# Patient Record
Sex: MALE | Race: WHITE | Employment: FULL TIME | URBAN - METROPOLITAN AREA
[De-identification: names, ages, dates, MRNs, and addresses within clinical notes are randomized per-mention and may not be internally consistent; named-entity substitution may affect disease eponyms.]

---

## 2017-03-15 ENCOUNTER — OFFICE VISIT (OUTPATIENT)
Dept: FAMILY MEDICINE CLINIC | Age: 42
End: 2017-03-15

## 2017-03-15 VITALS
HEART RATE: 64 BPM | DIASTOLIC BLOOD PRESSURE: 78 MMHG | SYSTOLIC BLOOD PRESSURE: 120 MMHG | HEIGHT: 73 IN | BODY MASS INDEX: 35.98 KG/M2 | RESPIRATION RATE: 16 BRPM | WEIGHT: 271.5 LBS

## 2017-03-15 DIAGNOSIS — J20.9 ACUTE BRONCHITIS, UNSPECIFIED ORGANISM: Primary | ICD-10-CM

## 2017-03-15 PROCEDURE — 99213 OFFICE O/P EST LOW 20 MIN: CPT | Performed by: FAMILY MEDICINE

## 2017-03-15 RX ORDER — ASCORBIC ACID 500 MG
500 TABLET ORAL DAILY
COMMUNITY

## 2017-03-15 RX ORDER — AZITHROMYCIN 250 MG/1
TABLET, FILM COATED ORAL
Qty: 1 PACKET | Refills: 0 | Status: SHIPPED | OUTPATIENT
Start: 2017-03-15 | End: 2017-03-25

## 2017-03-15 RX ORDER — GUAIFENESIN AND CODEINE PHOSPHATE 100; 10 MG/5ML; MG/5ML
10 SOLUTION ORAL 4 TIMES DAILY PRN
Qty: 180 ML | Refills: 0 | Status: SHIPPED | OUTPATIENT
Start: 2017-03-15 | End: 2017-10-04

## 2017-03-15 RX ORDER — PREDNISONE 20 MG/1
20 TABLET ORAL DAILY
Qty: 7 TABLET | Refills: 0 | Status: SHIPPED | OUTPATIENT
Start: 2017-03-15 | End: 2017-03-22

## 2017-03-15 ASSESSMENT — ENCOUNTER SYMPTOMS
DIARRHEA: 0
COUGH: 1
EYE DISCHARGE: 0
SINUS PRESSURE: 0
SORE THROAT: 0
NAUSEA: 0
CHEST TIGHTNESS: 1
RHINORRHEA: 0
SHORTNESS OF BREATH: 0

## 2017-09-25 ENCOUNTER — TELEPHONE (OUTPATIENT)
Dept: FAMILY MEDICINE CLINIC | Age: 42
End: 2017-09-25

## 2017-10-04 ENCOUNTER — OFFICE VISIT (OUTPATIENT)
Dept: FAMILY MEDICINE CLINIC | Age: 42
End: 2017-10-04

## 2017-10-04 VITALS
HEART RATE: 60 BPM | SYSTOLIC BLOOD PRESSURE: 124 MMHG | DIASTOLIC BLOOD PRESSURE: 66 MMHG | HEIGHT: 73 IN | RESPIRATION RATE: 14 BRPM | TEMPERATURE: 98.3 F | BODY MASS INDEX: 36.75 KG/M2 | WEIGHT: 277.25 LBS

## 2017-10-04 DIAGNOSIS — Z23 NEED FOR IMMUNIZATION AGAINST INFLUENZA: ICD-10-CM

## 2017-10-04 DIAGNOSIS — Z12.11 SCREEN FOR COLON CANCER: ICD-10-CM

## 2017-10-04 DIAGNOSIS — Z00.00 WELL ADULT EXAM: Primary | ICD-10-CM

## 2017-10-04 PROCEDURE — 90471 IMMUNIZATION ADMIN: CPT | Performed by: FAMILY MEDICINE

## 2017-10-04 PROCEDURE — 90688 IIV4 VACCINE SPLT 0.5 ML IM: CPT | Performed by: FAMILY MEDICINE

## 2017-10-04 PROCEDURE — 99396 PREV VISIT EST AGE 40-64: CPT | Performed by: FAMILY MEDICINE

## 2017-10-04 ASSESSMENT — PATIENT HEALTH QUESTIONNAIRE - PHQ9
SUM OF ALL RESPONSES TO PHQ9 QUESTIONS 1 & 2: 0
2. FEELING DOWN, DEPRESSED OR HOPELESS: 0
SUM OF ALL RESPONSES TO PHQ QUESTIONS 1-9: 0
1. LITTLE INTEREST OR PLEASURE IN DOING THINGS: 0

## 2017-10-04 ASSESSMENT — ENCOUNTER SYMPTOMS
CONSTIPATION: 0
SHORTNESS OF BREATH: 0
SINUS PRESSURE: 0

## 2017-10-04 NOTE — MR AVS SNAPSHOT
type 2 diabetes, stroke, gallstones, arthritis, sleep apnea, and certain cancers. BMI is not perfect. It may overestimate body fat in athletes and people who are more muscular. Even a small weight loss (between 5 and 10 percent of your current weight) by decreasing your calorie intake and becoming more physically active will help lower your risk of developing or worsening diseases associated with obesity. Learn more at: [employer]          Instructions    Do the fasting labs soon  See me in a year  Continue the diet and weight loss                  Today's Medication Changes          These changes are accurate as of: 10/4/17  2:57 PM.  If you have any questions, ask your nurse or doctor. STOP taking these medications           guaiFENesin-codeine 100-10 MG/5ML syrup   Commonly known as:  CHERATUSSIN AC   Stopped by:  Catia Day MD               Your Current Medications Are              Ascorbic Acid (VITAMIN C) 500 MG tablet Take 500 mg by mouth daily    Multiple Vitamins-Minerals (THERAPEUTIC MULTIVITAMIN-MINERALS) tablet Take 1 tablet by mouth daily.       Allergies           No Known Allergies      We Ordered/Performed the following           INFLUENZA, QUADV, 6 MO AND OLDER, IM, MDV, 0.5ML (Itzel Mcgraw)          Additional Information        Basic Information     Date Of Birth Sex Race Ethnicity Preferred Language    1975 Male White Non-/Non  English      Problem List as of 10/4/2017  Date Reviewed: 8/8/2012                Tobacco dependence in remission    Obesity, Class II, BMI 35-39.9, no comorbidity ([facility] [address]      Immunizations as of 10/4/2017     Name Date    Influenza Vaccine, unspecified formulation 10/2/2016    Influenza, Quadv, 6 mo and older, IM (Itzel Mcgraw) 10/4/2017      Preventive Care        Date Due    HIV screening is recommended for all people regardless of risk factors aged 15-65 years at least once (lifetime) who have never been HIV tested. 1/18/1991    Tetanus Combination Vaccine (1 - Tdap) 1/18/1995    Cholesterol Screening 1/18/2016            MyChart Signup           Clickst allows you to send messages to your doctor, view your test results, renew your prescriptions, schedule appointments, view visit notes, and more. How Do I Sign Up? 1. In your Internet browser, go to [email or web address]  2. Click on the Sign Up Now link in the Sign In box. You will see the New Member Sign Up page. 3. Enter your Clickst Access Code exactly as it appears below. You will not need to use this code after youve completed the sign-up process. If you do not sign up before the expiration date, you must request a new code. Clickst Access Code: [email or web address]  Expires: 12/3/2017  2:57 PM    4. Enter your Social Security Number ([id number]) and Date of Birth (mm/dd/yyyy) as indicated and click Submit. You will be taken to the next sign-up page. 5. Create a Clickst ID. This will be your Clickst login ID and cannot be changed, so think of one that is secure and easy to remember. 6. Create a Clickst password. You can change your password at any time. 7. Enter your Password Reset Question and Answer. This can be used at a later time if you forget your password. 8. Enter your e-mail address. You will receive e-mail notification when new information is available in [address] 9. Click Sign Up. You can now view your medical record. Additional Information  If you have questions, please contact the physician practice where you receive care. Remember, Clickst is NOT to be used for urgent needs. For medical emergencies, dial 911. For questions regarding your Clickst account call [phone]. If you have a clinical question, please call your doctor's office.

## 2017-10-09 ENCOUNTER — TELEPHONE (OUTPATIENT)
Dept: FAMILY MEDICINE CLINIC | Age: 42
End: 2017-10-09

## 2017-10-09 ENCOUNTER — OFFICE VISIT (OUTPATIENT)
Dept: FAMILY MEDICINE CLINIC | Age: 42
End: 2017-10-09

## 2017-10-09 VITALS
SYSTOLIC BLOOD PRESSURE: 110 MMHG | DIASTOLIC BLOOD PRESSURE: 70 MMHG | HEART RATE: 60 BPM | WEIGHT: 274.13 LBS | RESPIRATION RATE: 16 BRPM | HEIGHT: 73 IN | BODY MASS INDEX: 36.33 KG/M2

## 2017-10-09 DIAGNOSIS — J20.9 ACUTE BRONCHITIS, UNSPECIFIED ORGANISM: Primary | ICD-10-CM

## 2017-10-09 PROCEDURE — 99213 OFFICE O/P EST LOW 20 MIN: CPT | Performed by: FAMILY MEDICINE

## 2017-10-09 RX ORDER — GUAIFENESIN AND CODEINE PHOSPHATE 100; 10 MG/5ML; MG/5ML
10 SOLUTION ORAL 4 TIMES DAILY PRN
Qty: 180 ML | Refills: 0 | Status: SHIPPED | OUTPATIENT
Start: 2017-10-09 | End: 2017-10-16 | Stop reason: SDUPTHER

## 2017-10-09 ASSESSMENT — ENCOUNTER SYMPTOMS
NAUSEA: 0
DIARRHEA: 0
SINUS PRESSURE: 1
SORE THROAT: 1
CHEST TIGHTNESS: 0
WHEEZING: 0
COUGH: 1
EYE DISCHARGE: 0

## 2017-10-09 NOTE — PROGRESS NOTES
Subjective:      Patient ID: Rigo Squires is a 40 y.o. male. HPI  1. He states that the day after he was here there was onset of cough and congestion  2. He used nyquil, robitussin DM without help  3. He will cough to the point where he can't catch his breath  Review of Systems   Constitutional: Negative for chills and fatigue. HENT: Positive for congestion, sinus pressure and sore throat. Negative for ear pain. Eyes: Negative for discharge. Respiratory: Positive for cough. Negative for chest tightness and wheezing. Cardiovascular: Negative for chest pain. Gastrointestinal: Negative for diarrhea and nausea. Genitourinary: Negative. Musculoskeletal: Positive for arthralgias and myalgias. Neurological: Negative for headaches. The patient's medications, allergies, past medical problems, surgical, social, and family histories were reviewed and updated as needed. Objective:   Physical Exam   Constitutional: He is oriented to person, place, and time. He appears well-developed and well-nourished. No distress. HENT:   Head: Normocephalic and atraumatic. Right Ear: External ear normal.   Left Ear: External ear normal.   Nose: Nose normal.   Mouth/Throat: Oropharynx is clear and moist.   Eyes: Conjunctivae are normal. No scleral icterus. Neck: Neck supple. No tracheal deviation present. No thyromegaly present. Cardiovascular: Normal rate, regular rhythm and normal heart sounds. Pulmonary/Chest: Effort normal and breath sounds normal.   Musculoskeletal: He exhibits no edema. Lymphadenopathy:     He has no cervical adenopathy. Neurological: He is alert and oriented to person, place, and time. Skin: Skin is warm and dry. Psychiatric: He has a normal mood and affect. His behavior is normal.   Blood pressure 110/70, pulse 60, resp. rate 16, height 6' 1\" (1.854 m), weight 274 lb 2 oz (124.3 kg). Assessment:      1.  Acute bronchitis, unspecified organism  guaiFENesin-codeine (CHERATUSSIN AC) 100-10 MG/5ML syrup           Plan:      Off today

## 2017-10-09 NOTE — TELEPHONE ENCOUNTER
Patient called C/O dry cough, worse at night. No other complaints just the cough. Has tried Robitussin and Nyquil and neither really do anything for the cough. Uses Mover.

## 2017-10-09 NOTE — LETTER
[facility]  [address],[facility] [address] [address]  Phone: [phone]  Fax: [phone]    Mick Mendenhall MD        October 9, 2017     Patient: Natalee Mcclain   YOB: 1975   Date of Visit: 10/9/2017       To Whom it May Concern: Hanna Robledo was seen in my clinic on 10/9/2017. He may return to work on 10/10/17. Please excuse him for today due to illness. If you have any questions or concerns, please don't hesitate to call.     Sincerely,         Mick Mendenhall MD

## 2017-10-16 DIAGNOSIS — J20.9 ACUTE BRONCHITIS, UNSPECIFIED ORGANISM: ICD-10-CM

## 2017-10-16 RX ORDER — GUAIFENESIN AND CODEINE PHOSPHATE 100; 10 MG/5ML; MG/5ML
10 SOLUTION ORAL 4 TIMES DAILY PRN
Qty: 180 ML | Refills: 0 | Status: SHIPPED | OUTPATIENT
Start: 2017-10-16 | End: 2017-11-16 | Stop reason: SDUPTHER

## 2017-10-16 NOTE — TELEPHONE ENCOUNTER
Pt called and req refill on cough syrup Dr prescribed.     Please send to:    [facility]

## 2017-11-16 ENCOUNTER — TELEPHONE (OUTPATIENT)
Dept: FAMILY MEDICINE CLINIC | Age: 42
End: 2017-11-16

## 2017-11-16 DIAGNOSIS — J20.9 ACUTE BRONCHITIS, UNSPECIFIED ORGANISM: ICD-10-CM

## 2017-11-16 RX ORDER — GUAIFENESIN AND CODEINE PHOSPHATE 100; 10 MG/5ML; MG/5ML
10 SOLUTION ORAL 4 TIMES DAILY PRN
Qty: 180 ML | Refills: 0 | Status: SHIPPED | OUTPATIENT
Start: 2017-11-16 | End: 2019-01-15 | Stop reason: SDUPTHER

## 2017-11-16 NOTE — TELEPHONE ENCOUNTER
Pt called said that cough is back. Said that he got better for about a week and a half and then it just came right back. Wondering if he could get another Rx for the cough med. Zachary Verdin it is especially bad at night.     [facility] [place])

## 2019-01-15 ENCOUNTER — OFFICE VISIT (OUTPATIENT)
Dept: FAMILY MEDICINE CLINIC | Age: 44
End: 2019-01-15

## 2019-01-15 VITALS
RESPIRATION RATE: 16 BRPM | WEIGHT: 275.8 LBS | BODY MASS INDEX: 36.55 KG/M2 | SYSTOLIC BLOOD PRESSURE: 132 MMHG | DIASTOLIC BLOOD PRESSURE: 74 MMHG | HEIGHT: 73 IN | HEART RATE: 76 BPM | TEMPERATURE: 98 F

## 2019-01-15 DIAGNOSIS — J01.90 ACUTE SINUSITIS, RECURRENCE NOT SPECIFIED, UNSPECIFIED LOCATION: Primary | ICD-10-CM

## 2019-01-15 DIAGNOSIS — J20.9 ACUTE BRONCHITIS, UNSPECIFIED ORGANISM: ICD-10-CM

## 2019-01-15 PROCEDURE — 99213 OFFICE O/P EST LOW 20 MIN: CPT | Performed by: EMERGENCY MEDICINE

## 2019-01-15 RX ORDER — LEVOFLOXACIN 500 MG/1
500 TABLET, FILM COATED ORAL DAILY
Qty: 10 TABLET | Refills: 0 | Status: SHIPPED | OUTPATIENT
Start: 2019-01-15 | End: 2019-01-25

## 2019-01-15 RX ORDER — GUAIFENESIN AND CODEINE PHOSPHATE 100; 10 MG/5ML; MG/5ML
10 SOLUTION ORAL 4 TIMES DAILY PRN
Qty: 180 ML | Refills: 0 | Status: SHIPPED | OUTPATIENT
Start: 2019-01-15 | End: 2019-03-11 | Stop reason: SDUPTHER

## 2019-01-15 ASSESSMENT — ENCOUNTER SYMPTOMS
SHORTNESS OF BREATH: 0
SINUS PRESSURE: 0
COUGH: 1
WHEEZING: 0
BACK PAIN: 0
SORE THROAT: 0
NAUSEA: 0
CHEST TIGHTNESS: 0
DIARRHEA: 0
CONSTIPATION: 0
TROUBLE SWALLOWING: 0
VOMITING: 0
VOICE CHANGE: 1
ABDOMINAL PAIN: 0
RHINORRHEA: 1

## 2019-03-11 ENCOUNTER — TELEPHONE (OUTPATIENT)
Dept: FAMILY MEDICINE CLINIC | Age: 44
End: 2019-03-11

## 2019-03-11 DIAGNOSIS — J20.9 ACUTE BRONCHITIS, UNSPECIFIED ORGANISM: ICD-10-CM

## 2019-03-11 DIAGNOSIS — J01.90 ACUTE SINUSITIS, RECURRENCE NOT SPECIFIED, UNSPECIFIED LOCATION: ICD-10-CM

## 2019-03-24 RX ORDER — GUAIFENESIN AND CODEINE PHOSPHATE 100; 10 MG/5ML; MG/5ML
10 SOLUTION ORAL 4 TIMES DAILY PRN
Qty: 150 ML | Refills: 0 | OUTPATIENT
Start: 2019-03-24 | End: 2019-11-25 | Stop reason: SDUPTHER

## 2019-11-20 ENCOUNTER — TELEPHONE (OUTPATIENT)
Dept: FAMILY MEDICINE CLINIC | Age: 44
End: 2019-11-20

## 2019-11-20 RX ORDER — BENZONATATE 200 MG/1
200 CAPSULE ORAL 3 TIMES DAILY PRN
Qty: 30 CAPSULE | Refills: 0 | Status: SHIPPED | OUTPATIENT
Start: 2019-11-20 | End: 2019-11-27

## 2019-11-25 ENCOUNTER — OFFICE VISIT (OUTPATIENT)
Dept: FAMILY MEDICINE CLINIC | Age: 44
End: 2019-11-25

## 2019-11-25 VITALS
HEART RATE: 72 BPM | WEIGHT: 282 LBS | RESPIRATION RATE: 10 BRPM | BODY MASS INDEX: 37.37 KG/M2 | HEIGHT: 73 IN | DIASTOLIC BLOOD PRESSURE: 70 MMHG | SYSTOLIC BLOOD PRESSURE: 126 MMHG

## 2019-11-25 DIAGNOSIS — J20.9 ACUTE BRONCHITIS, UNSPECIFIED ORGANISM: ICD-10-CM

## 2019-11-25 DIAGNOSIS — J01.90 ACUTE SINUSITIS, RECURRENCE NOT SPECIFIED, UNSPECIFIED LOCATION: ICD-10-CM

## 2019-11-25 PROCEDURE — 99213 OFFICE O/P EST LOW 20 MIN: CPT | Performed by: FAMILY MEDICINE

## 2019-11-25 RX ORDER — AMOXICILLIN AND CLAVULANATE POTASSIUM 875; 125 MG/1; MG/1
1 TABLET, FILM COATED ORAL 2 TIMES DAILY
Qty: 20 TABLET | Refills: 0 | Status: SHIPPED | OUTPATIENT
Start: 2019-11-25 | End: 2019-12-05

## 2019-11-25 RX ORDER — GUAIFENESIN AND CODEINE PHOSPHATE 100; 10 MG/5ML; MG/5ML
10 SOLUTION ORAL 4 TIMES DAILY PRN
Qty: 150 ML | Refills: 0 | Status: SHIPPED | OUTPATIENT
Start: 2019-11-25 | End: 2021-04-27 | Stop reason: SDUPTHER

## 2019-11-25 ASSESSMENT — PATIENT HEALTH QUESTIONNAIRE - PHQ9
2. FEELING DOWN, DEPRESSED OR HOPELESS: 0
SUM OF ALL RESPONSES TO PHQ9 QUESTIONS 1 & 2: 0
1. LITTLE INTEREST OR PLEASURE IN DOING THINGS: 0
SUM OF ALL RESPONSES TO PHQ QUESTIONS 1-9: 0
SUM OF ALL RESPONSES TO PHQ QUESTIONS 1-9: 0

## 2019-11-25 ASSESSMENT — ENCOUNTER SYMPTOMS
BLOOD IN STOOL: 0
SHORTNESS OF BREATH: 0
SORE THROAT: 1
BACK PAIN: 0
ABDOMINAL PAIN: 0
NAUSEA: 0
RHINORRHEA: 1
WHEEZING: 0
COUGH: 1
CHEST TIGHTNESS: 0
CONSTIPATION: 0
EYE PAIN: 0
TROUBLE SWALLOWING: 0

## 2020-01-03 ENCOUNTER — OFFICE VISIT (OUTPATIENT)
Dept: FAMILY MEDICINE CLINIC | Age: 45
End: 2020-01-03

## 2020-01-03 VITALS
HEIGHT: 72 IN | SYSTOLIC BLOOD PRESSURE: 100 MMHG | RESPIRATION RATE: 16 BRPM | BODY MASS INDEX: 38.23 KG/M2 | WEIGHT: 282.25 LBS | HEART RATE: 70 BPM | DIASTOLIC BLOOD PRESSURE: 60 MMHG

## 2020-01-03 PROCEDURE — 99214 OFFICE O/P EST MOD 30 MIN: CPT | Performed by: FAMILY MEDICINE

## 2020-01-03 PROCEDURE — 93000 ELECTROCARDIOGRAM COMPLETE: CPT | Performed by: FAMILY MEDICINE

## 2020-01-03 ASSESSMENT — ENCOUNTER SYMPTOMS
SINUS PRESSURE: 0
CONSTIPATION: 0
SHORTNESS OF BREATH: 1

## 2020-01-03 ASSESSMENT — PATIENT HEALTH QUESTIONNAIRE - PHQ9
1. LITTLE INTEREST OR PLEASURE IN DOING THINGS: 0
SUM OF ALL RESPONSES TO PHQ QUESTIONS 1-9: 0
2. FEELING DOWN, DEPRESSED OR HOPELESS: 0
SUM OF ALL RESPONSES TO PHQ9 QUESTIONS 1 & 2: 0
SUM OF ALL RESPONSES TO PHQ QUESTIONS 1-9: 0

## 2020-01-03 NOTE — PROGRESS NOTES
Echo and holter monitor scheduled for 1/9/2020  145pm arrive at 130pm no prep at [facility]. Patient informed via phone.

## 2020-01-03 NOTE — PROGRESS NOTES
Subjective:      Patient ID: Dellis Eisenmenger is a 38 y.o. male. HPI  1. He feels as if the heart is beating fast and hard. 2. It seems to be the past few weeks and steady the past 2 days  3. There is some caffeine intake but not over normal  4. He quit smoking in 2010 but smoked for 20 years  Review of Systems   Constitutional: Negative for fatigue. HENT: Negative for sinus pressure. Eyes: Negative for visual disturbance. Respiratory: Positive for shortness of breath. Cardiovascular: Positive for palpitations. Negative for chest pain. Gastrointestinal: Negative for constipation. Genitourinary: Negative. Musculoskeletal: Negative for arthralgias. Skin: Negative for rash. Neurological: Negative for headaches. The patient's medications, allergies, past medical problems, surgical, social, and family histories were reviewed and updated as needed. Objective:   Physical Exam  Constitutional:       General: He is not in acute distress. Appearance: He is well-developed. HENT:      Head: Normocephalic and atraumatic. Eyes:      General: No scleral icterus. Conjunctiva/sclera: Conjunctivae normal.   Neck:      Trachea: No tracheal deviation. Cardiovascular:      Rate and Rhythm: Normal rate and regular rhythm. Heart sounds: Normal heart sounds. Pulmonary:      Effort: Pulmonary effort is normal.      Breath sounds: Normal breath sounds. Skin:     General: Skin is warm and dry. Neurological:      Mental Status: He is alert and oriented to person, place, and time. Psychiatric:         Behavior: Behavior normal.     Blood pressure 100/60, pulse 70, resp. rate 16, height 6' (1.829 m), weight 282 lb 4 oz (128 kg). The ekg is compared to 3/16/16 and read by me showing NSR and no ischemic change    Assessment:       Diagnosis Orders   1.  Tachycardia  EKG 12 Lead    Lipid, Fasting    Comprehensive Metabolic Panel, Fasting    TSH    Echocardiogram complete    Holter Monitor 48 Clifford Berg MD, Cardiology, [facility]           Plan:      Do the fasting labs soon  Get the holter monitor and echo done  See Dr Oziel Srivastava  See me soon for the wellness exam        Paula Graves MD

## 2020-01-06 LAB
ALBUMIN SERPL-MCNC: 4.6 G/DL (ref 3.2–5.3)
ALK PHOSPHATASE: 90 U/L (ref 39–130)
ALT SERPL-CCNC: 42 U/L (ref 0–40)
ANION GAP SERPL CALCULATED.3IONS-SCNC: 9 MMOL/L (ref 4–12)
AST SERPL-CCNC: 28 U/L (ref 0–41)
BILIRUB SERPL-MCNC: 1.1 MG/DL (ref 0.3–1.2)
BUN BLDV-MCNC: 14 MG/DL (ref 5–23)
CALCIUM SERPL-MCNC: 9.5 MG/DL (ref 8.5–10.5)
CHLORIDE BLD-SCNC: 102 MMOL/L (ref 98–109)
CHOLESTEROL/HDL RATIO: 4.6 (ref 1–5)
CHOLESTEROL: 174 MG/DL (ref 150–200)
CO2: 30 MMOL/L (ref 22–32)
CREAT SERPL-MCNC: 1.06 MG/DL (ref 0.6–1.3)
EGFR AFRICAN AMERICAN: >60 ML/MIN/1.73SQ.M
EGFR IF NONAFRICAN AMERICAN: >60 ML/MIN/1.73SQ.M
GLUCOSE: 92 MG/DL (ref 65–99)
HDLC SERPL-MCNC: 38 MG/DL
LDL CHOLESTEROL CALCULATED: 107 MG/DL
LDL/HDL RATIO: 2.8
MAGNESIUM: 2.1 MG/DL (ref 1.8–2.6)
POTASSIUM SERPL-SCNC: 4.3 MMOL/L (ref 3.5–5)
SODIUM BLD-SCNC: 141 MMOL/L (ref 134–146)
TOTAL PROTEIN: 7 G/DL (ref 6–8)
TRIGL SERPL-MCNC: 144 MG/DL (ref 27–150)
TSH SERPL DL<=0.05 MIU/L-ACNC: 0.94 UIU/ML (ref 0.49–4.67)
VLDLC SERPL CALC-MCNC: 29 MG/DL (ref 0–30)

## 2020-01-09 ENCOUNTER — HOSPITAL ENCOUNTER (OUTPATIENT)
Dept: NON INVASIVE DIAGNOSTICS | Age: 45
Discharge: HOME | End: 2020-01-09
Payer: COMMERCIAL

## 2020-01-09 LAB
LV EF: 60 %
LVEF MODALITY: NORMAL

## 2020-01-09 PROCEDURE — 93306 TTE W/DOPPLER COMPLETE: CPT

## 2020-01-09 PROCEDURE — 93225 XTRNL ECG REC<48 HRS REC: CPT

## 2020-01-09 PROCEDURE — 93226 XTRNL ECG REC<48 HR SCAN A/R: CPT

## 2020-01-09 NOTE — PROCEDURES
The skin was prepped and a holter monitor was applied. The patient was instructed on the documentation of symptoms and the purpose of the holter as well as the things to avoid while wearing the holter. The patient was instructed to remove and return the holter on 01/11/2020.   The serial number of the holter that was applied is [id number]

## 2020-01-10 ENCOUNTER — TELEPHONE (OUTPATIENT)
Dept: FAMILY MEDICINE CLINIC | Age: 45
End: 2020-01-10

## 2020-01-10 NOTE — TELEPHONE ENCOUNTER
----- Message from Ellyn Sullivan MD sent at 1/9/2020  9:01 PM EST -----  Let him know the echocardiogram was ok

## 2020-01-10 NOTE — TELEPHONE ENCOUNTER
----- Message from Joe Mclain MD sent at 1/9/2020  9:37 PM EST -----  Let him know the labs were fine.

## 2020-01-13 LAB
ACQUISITION DURATION: NORMAL S
AVERAGE HEART RATE: 76 BPM
HOOKUP DATE: NORMAL
HOOKUP TIME: NORMAL
MAX HEART RATE TIME/DATE: NORMAL
MAX HEART RATE: 133 BPM
MIN HEART RATE TIME/DATE: NORMAL
MIN HEART RATE: 47 BPM
NUMBER OF QRS COMPLEXES: NORMAL
NUMBER OF SUPRAVENTRICULAR BEATS IN RUNS: 0
NUMBER OF SUPRAVENTRICULAR COUPLETS: 0
NUMBER OF SUPRAVENTRICULAR ECTOPICS: 11
NUMBER OF SUPRAVENTRICULAR ISOLATED BEATS: 11
NUMBER OF SUPRAVENTRICULAR RUNS: 0
NUMBER OF VENTRICULAR BEATS IN RUNS: 0
NUMBER OF VENTRICULAR BIGEMINAL CYCLES: 0
NUMBER OF VENTRICULAR COUPLETS: 0
NUMBER OF VENTRICULAR ECTOPICS: 5
NUMBER OF VENTRICULAR ISOLATED BEATS: 5
NUMBER OF VENTRICULAR RUNS: 0

## 2020-01-14 ENCOUNTER — TELEPHONE (OUTPATIENT)
Dept: FAMILY MEDICINE CLINIC | Age: 45
End: 2020-01-14

## 2020-01-14 NOTE — TELEPHONE ENCOUNTER
----- Message from Melissa Awan MD sent at 1/13/2020  9:34 PM EST -----  Let him know that there were some fast heart beats seen on the holter but no irregular rhythms seen. Did he have any of the hard and pounding episodes while he wore the monitor. It would be good to see Dr Sly Bull still as arranged.

## 2020-02-11 ENCOUNTER — OFFICE VISIT (OUTPATIENT)
Dept: CARDIOLOGY CLINIC | Age: 45
End: 2020-02-11
Payer: COMMERCIAL

## 2020-02-11 VITALS
DIASTOLIC BLOOD PRESSURE: 68 MMHG | HEART RATE: 68 BPM | HEIGHT: 72 IN | SYSTOLIC BLOOD PRESSURE: 134 MMHG | BODY MASS INDEX: 37.73 KG/M2 | WEIGHT: 278.6 LBS

## 2020-02-11 PROCEDURE — 99204 OFFICE O/P NEW MOD 45 MIN: CPT | Performed by: NUCLEAR MEDICINE

## 2020-02-11 ASSESSMENT — ENCOUNTER SYMPTOMS
ABDOMINAL PAIN: 0
PHOTOPHOBIA: 0
NAUSEA: 0
ABDOMINAL DISTENTION: 0
SHORTNESS OF BREATH: 1
CHEST TIGHTNESS: 0
ANAL BLEEDING: 0
DIARRHEA: 0
RHINORRHEA: 0
COLOR CHANGE: 0
CONSTIPATION: 0
VOMITING: 0
RECTAL PAIN: 0
BLOOD IN STOOL: 0
BACK PAIN: 0

## 2020-02-11 NOTE — PROGRESS NOTES
Completed    Hepatitis A vaccine  Aged Out    Hepatitis B vaccine  Aged Out    Hib vaccine  Aged Out    Meningococcal (ACWY) vaccine  Aged Out    Pneumococcal 0-64 years Vaccine  Aged Out       Subjective:  Review of Systems   Constitutional: Positive for fatigue. HENT: Negative for hearing loss and rhinorrhea. Eyes: Negative for photophobia. Respiratory: Positive for shortness of breath. Negative for chest tightness. Cardiovascular: Positive for palpitations. Negative for chest pain and leg swelling. Gastrointestinal: Negative for abdominal distention, abdominal pain, anal bleeding, blood in stool, constipation, diarrhea, nausea, rectal pain and vomiting. Endocrine: Negative for polyphagia. Genitourinary: Negative for dysuria, hematuria and urgency. Musculoskeletal: Negative for arthralgias, back pain, gait problem, joint swelling, myalgias, neck pain and neck stiffness. Skin: Negative for color change, pallor, rash and wound. Neurological: Negative for dizziness, syncope and light-headedness. Hematological: Negative for adenopathy. Psychiatric/Behavioral: Negative for confusion, decreased concentration, dysphoric mood and hallucinations. The patient is not nervous/anxious and is not hyperactive. Objective:  Physical Exam  HENT:      Nose: Nose normal.      Mouth/Throat:      Pharynx: No oropharyngeal exudate. Cardiovascular:      Rate and Rhythm: Regular rhythm. Tachycardia present. Heart sounds: Murmur present. Pulmonary:      Effort: No respiratory distress. Breath sounds: No stridor. No wheezing, rhonchi or rales. Chest:      Chest wall: No tenderness. Abdominal:      General: There is no distension. Palpations: There is no mass. Tenderness: There is no abdominal tenderness. There is no right CVA tenderness, left CVA tenderness, guarding or rebound. Hernia: No hernia is present.    Musculoskeletal:         General: No swelling, tenderness, deformity or signs of injury. Right lower leg: No edema. Left lower leg: No edema. Skin:     Coloration: Skin is not jaundiced or pale. Findings: No bruising, erythema, lesion or rash. Neurological:      Cranial Nerves: No cranial nerve deficit. Sensory: No sensory deficit. Motor: No weakness. Coordination: Coordination normal.      Gait: Gait normal.      Deep Tendon Reflexes: Reflexes normal.   Psychiatric:         Mood and Affect: Mood normal.         Thought Content: Thought content normal.       /68   Pulse 68   Ht 6' (1.829 m)   Wt 278 lb 9.6 oz (126.4 kg)   BMI 37.78 kg/m²     Assessment:      Diagnosis Orders   1. Tachycardia     possible A fib vs other arrhythmia  Likely sleep apnea   Moderate risk for CAD  Normal ECG     Plan:  No follow-ups on file. Discussed  Might need to be checked for sleep apnea  Obesity: extensive discussion was made with the patient regarding diet and weight control including specific food items to avoid and cut down  Cut down on caffeine  Consider a stress test   2 week event monitor  Continue risk factor modification and medical management  Thank you for allowing me to participate in the care of your patient. Please don't hesitate to contact me regarding any further issues related to the patient care    Orders Placed:  No orders of the defined types were placed in this encounter. Medications Prescribed:  No orders of the defined types were placed in this encounter. Discussed use, benefit, and side effects of prescribed medications. All patient questions answered. Pt voicedunderstanding. Instructed to continue current medications, diet and exercise. Continue risk factor modification and medical management. Patient agreed with treatment plan. Follow up as directed.     Electronically signedby Sania Barrett MD on 2/11/2020 at 7:39 AM

## 2020-02-17 ENCOUNTER — HOSPITAL ENCOUNTER (OUTPATIENT)
Dept: NON INVASIVE DIAGNOSTICS | Age: 45
Discharge: HOME | End: 2020-02-17
Payer: COMMERCIAL

## 2020-02-17 PROCEDURE — 0296T HC EXT ECG RECORDING 2-21 DAY HOOKUP: CPT

## 2020-03-10 NOTE — PROCEDURES
[address] [place] [address]                                 EVENT MONITOR    PATIENT NAME: MICHAEL JERONIMO                        :        1975  MED REC NO:   [id number]                           ROOM:  ACCOUNT NO:   [de-identified]                           ADMIT DATE: 2020  PROVIDER:     Merari Lockett M.D. CLINICAL HISTORY AND INDICATION:  This is a patient with tachycardia. EVENT MONITOR DESCRIPTION:  Event monitor was attached to the patient  between 2020 to 2020. EVENT MONITOR FINDINGS:  Baseline rhythm showed sinus rhythm with  occasional PVCs. There were several PVCs and PACs through the test,  most of the patient's events correlated with isolated ectopy, otherwise  no sustained arrhythmia. CONCLUSION:  1. Sinus rhythm. 2.  Unfortunately, this patient looks like have been mainly feeling the  isolated ventricular ectopy as noted on the multiple events trigger by  the patient; otherwise, there was no obvious sustained arrhythmia on  this monitor. Further evaluation to follow accordingly.         Shreya Chase M.D.    D: 2020 16:17:23       T: 2020 17:26:18     [email or web address]  Job#: [id number]     Doc#: [id number]    CC:

## 2020-04-24 ENCOUNTER — TELEMEDICINE (OUTPATIENT)
Dept: CARDIOLOGY CLINIC | Age: 45
End: 2020-04-24
Payer: COMMERCIAL

## 2020-04-24 ENCOUNTER — TELEPHONE (OUTPATIENT)
Dept: CARDIOLOGY CLINIC | Age: 45
End: 2020-04-24

## 2020-04-24 PROCEDURE — 99213 OFFICE O/P EST LOW 20 MIN: CPT | Performed by: NUCLEAR MEDICINE

## 2020-04-24 NOTE — TELEPHONE ENCOUNTER
Post-VV Call:    Left detailed message on patient's VM to schedule 8 month OV with Dr. Jacqueline Mcneal. Appt made for patient and next OV is 12-3-20 at 10:15    AVS mailed.

## 2020-05-15 ENCOUNTER — PATIENT MESSAGE (OUTPATIENT)
Dept: FAMILY MEDICINE CLINIC | Age: 45
End: 2020-05-15

## 2020-05-15 NOTE — TELEPHONE ENCOUNTER
From: Camilo Ron Nice  To: Nanci Peters MD  Sent: 5/15/2020 9:23 AM EDT  Subject: Non-Urgent Emilie Pappas,   I am having some ED type issues and wanted to know if you are the one to see or if you can refer me to another specialist. My symptoms involve numbness in the area. Please let me know if I need to schedule an appointment.   Thank Evy Campos

## 2020-05-18 NOTE — TELEPHONE ENCOUNTER
Pt called back asking if labs could be ordered and faxed to     [facility]    Pt req called once ordered so he can go to lab

## 2020-05-18 NOTE — TELEPHONE ENCOUNTER
I ordered the labs and would like to see him in the office when the results are available to go over them.

## 2020-06-03 ENCOUNTER — TELEPHONE (OUTPATIENT)
Dept: FAMILY MEDICINE CLINIC | Age: 45
End: 2020-06-03

## 2020-06-03 NOTE — TELEPHONE ENCOUNTER
Patient called wanting to know the results of labs he had drawn last week. Stated he went to [facility]. Results are in but they have not crossed over into the Epic system which we were waiting for. They are going to be scanned in.

## 2020-06-03 NOTE — TELEPHONE ENCOUNTER
----- Message from Lexa Reeder MD sent at 6/3/2020  1:34 PM EDT -----  Let him know that the testosterone level was fine and that the prolactin was up some.  I would like to repeat the non fasting prolactin level next week in the morning between 7-8 AM.

## 2020-06-09 LAB — PROLACTIN: 17.7 NG/ML (ref 2.6–13.1)

## 2020-06-10 ENCOUNTER — TELEPHONE (OUTPATIENT)
Dept: FAMILY MEDICINE CLINIC | Age: 45
End: 2020-06-10

## 2020-06-10 NOTE — TELEPHONE ENCOUNTER
Called patient insurance and mri doesn't meet testing criteria. Peer to peer number for  To call is [phone]. Case closes on 6/12/2020 please advise.

## 2020-06-17 ENCOUNTER — HOSPITAL ENCOUNTER (OUTPATIENT)
Dept: MRI IMAGING | Age: 45
Discharge: HOME | End: 2020-06-17
Payer: COMMERCIAL

## 2020-06-17 PROCEDURE — 6360000004 HC RX CONTRAST MEDICATION: Performed by: FAMILY MEDICINE

## 2020-06-17 PROCEDURE — 70553 MRI BRAIN STEM W/O & W/DYE: CPT

## 2020-06-17 PROCEDURE — A9579 GAD-BASE MR CONTRAST NOS,1ML: HCPCS | Performed by: FAMILY MEDICINE

## 2020-06-17 RX ADMIN — GADOTERIDOL 20 ML: 279.3 INJECTION, SOLUTION INTRAVENOUS at 17:14

## 2020-06-19 ENCOUNTER — TELEPHONE (OUTPATIENT)
Dept: FAMILY MEDICINE CLINIC | Age: 45
End: 2020-06-19

## 2020-06-19 NOTE — TELEPHONE ENCOUNTER
----- Message from Prachi Ch MD sent at 6/18/2020  9:45 PM EDT -----  Let him know the MRI of the brain was negative and it would be good to see Dr Ajay Almanza for the elevation of the prolactin level if he agrees.

## 2020-06-24 NOTE — TELEPHONE ENCOUNTER
Faxed referral to dr. Henrry Forbes and they will call and schedule patient. James Naidu informed by Terri.

## 2020-07-09 ENCOUNTER — PATIENT MESSAGE (OUTPATIENT)
Dept: FAMILY MEDICINE CLINIC | Age: 45
End: 2020-07-09

## 2020-07-09 NOTE — TELEPHONE ENCOUNTER
From: Anita Rivera Nice  To: Javier Young MD  Sent: 7/9/2020 10:09 AM EDT  Subject: Visit Follow-Up Question    Hello. I have tried to contact Dr. Pang office a few times. Unfortunately, they have very limited office hours. And have not called me back after leaving them messages about scheduling an appointment. They are closed by 3pm and take lunch at 12-1 with no one on the phones. This makes it very difficult for me to contact them. They are also closed on Fridays. Would it be possible for your office to contact them on my behalf and get an appointment scheduled? I would prefer afternoon as late as possible. The only dates I am not available is July 20-21st.     Please let me know.   [employer]

## 2020-07-13 ENCOUNTER — TELEPHONE (OUTPATIENT)
Dept: FAMILY MEDICINE CLINIC | Age: 45
End: 2020-07-13

## 2020-07-13 NOTE — TELEPHONE ENCOUNTER
Appt  Dr. Jairo Danielle is 7/29/2020 at 1pm.  Must have packet of papers sent to you filled out in whole. And wear mask.

## 2020-07-13 NOTE — TELEPHONE ENCOUNTER
MOM to return call to office   Appointment for dr. Saurabh Ontiveros office scheduled for 7/29/2020 at 1pm.  Must have packet filled out completely  And Must wear a mask.

## 2020-07-31 ENCOUNTER — PATIENT MESSAGE (OUTPATIENT)
Dept: FAMILY MEDICINE CLINIC | Age: 45
End: 2020-07-31

## 2020-08-26 ENCOUNTER — TELEPHONE (OUTPATIENT)
Dept: FAMILY MEDICINE CLINIC | Age: 45
End: 2020-08-26

## 2020-08-26 NOTE — TELEPHONE ENCOUNTER
Mya Hou called from Dr Velia Zhao ofc and would like to know if Dr Tatianna Kemp would clear for surgery. Dr Aydee Aguilar  Laminectomy & Decompression L3-4  Sept. 16, 2020  [facility]      Please call:   Mya Hou [phone]

## 2020-08-27 ENCOUNTER — TELEPHONE (OUTPATIENT)
Dept: FAMILY MEDICINE CLINIC | Age: 45
End: 2020-08-27

## 2020-09-01 ENCOUNTER — TELEPHONE (OUTPATIENT)
Dept: FAMILY MEDICINE CLINIC | Age: 45
End: 2020-09-01

## 2020-09-02 ENCOUNTER — OFFICE VISIT (OUTPATIENT)
Dept: FAMILY MEDICINE CLINIC | Age: 45
End: 2020-09-02

## 2020-09-02 VITALS
HEART RATE: 70 BPM | WEIGHT: 273.38 LBS | DIASTOLIC BLOOD PRESSURE: 80 MMHG | RESPIRATION RATE: 16 BRPM | BODY MASS INDEX: 37.03 KG/M2 | HEIGHT: 72 IN | TEMPERATURE: 97.2 F | SYSTOLIC BLOOD PRESSURE: 122 MMHG

## 2020-09-02 PROCEDURE — 69210 REMOVE IMPACTED EAR WAX UNI: CPT | Performed by: FAMILY MEDICINE

## 2020-09-02 PROCEDURE — 99396 PREV VISIT EST AGE 40-64: CPT | Performed by: FAMILY MEDICINE

## 2020-09-02 ASSESSMENT — ENCOUNTER SYMPTOMS
SINUS PRESSURE: 0
CONSTIPATION: 0
SHORTNESS OF BREATH: 0

## 2020-09-02 ASSESSMENT — PATIENT HEALTH QUESTIONNAIRE - PHQ9
2. FEELING DOWN, DEPRESSED OR HOPELESS: 0
SUM OF ALL RESPONSES TO PHQ9 QUESTIONS 1 & 2: 0
SUM OF ALL RESPONSES TO PHQ QUESTIONS 1-9: 0
1. LITTLE INTEREST OR PLEASURE IN DOING THINGS: 0
SUM OF ALL RESPONSES TO PHQ QUESTIONS 1-9: 0

## 2020-09-02 NOTE — PROGRESS NOTES
Subjective:      Patient ID: Rain Dejesus is a 41 y.o. male. HPI  Well Adult Physical   Patient here for a comprehensive physical exam.The patient reports problems - he needs a pre op eval for back surgery. He states no personal or FH of anesthesia problems, DVT or PE  Do you take any herbs or supplements that were not prescribed by a doctor? yes Are you taking calcium supplements? no Are you taking aspirin daily? no   History:  Patient receives prostate care at our clinic  Date last prostate exam: unknown  Date last PSA: N/A    Review of Systems   Constitutional: Negative for fatigue. HENT: Negative for sinus pressure. Eyes: Negative for visual disturbance. Respiratory: Negative for shortness of breath. Cardiovascular: Negative for chest pain. Gastrointestinal: Negative for constipation. Genitourinary: Negative. Musculoskeletal: Negative for arthralgias. Skin: Negative for rash. Neurological: Negative for headaches. The patient's medications, allergies, past medical problems, surgical, social, and family histories were reviewed and updated as needed. Objective:   Physical Exam  Constitutional:       General: He is not in acute distress. Appearance: He is well-developed. HENT:      Head: Normocephalic and atraumatic. Right Ear: External ear normal.      Left Ear: External ear normal.      Ears:      Comments: Bilateral cerumen impaction. Wax was removed from the affected ear(s) with a combination of the nurse using the water bottle, and myself using the curette. Inspection of the canal, and the tympanic membrane by myself afterward found no sign of injury. Nose: Nose normal.      Mouth/Throat:      Pharynx: No oropharyngeal exudate. Eyes:      General: No scleral icterus. Conjunctiva/sclera: Conjunctivae normal.   Neck:      Musculoskeletal: Neck supple. Thyroid: No thyromegaly. Vascular: No carotid bruit. Trachea: No tracheal deviation. Cardiovascular:      Rate and Rhythm: Normal rate and regular rhythm. Heart sounds: Normal heart sounds. Pulmonary:      Effort: Pulmonary effort is normal.      Breath sounds: Normal breath sounds. Abdominal:      General: Bowel sounds are normal.      Palpations: Abdomen is soft. There is no mass. Lymphadenopathy:      Cervical: No cervical adenopathy. Skin:     General: Skin is warm and dry. Neurological:      Mental Status: He is alert and oriented to person, place, and time. Psychiatric:         Behavior: Behavior normal.     Blood pressure 122/80, pulse 70, temperature 97.2 °F (36.2 °C), temperature source Infrared, resp. rate 16, height 6' (1.829 m), weight 273 lb 6 oz (124 kg). Assessment:       Diagnosis Orders   1. Well adult exam     2. Bilateral impacted cerumen  67048 - [place] REMOVE IMPACTED EAR WAX   3.  Pre-op evaluation             Plan:   See me in a year    The pre op evaluation is acceptable and he is cleared for surgery        Anitha Cherry MD

## 2020-09-02 NOTE — PROGRESS NOTES
Printed out all pre-op labs and imaging. [facility] called and left message to send ekg that patient just got via fax.   Awaiting ekg to send all pre-op to surgeon and hospital.

## 2020-09-08 ENCOUNTER — TELEPHONE (OUTPATIENT)
Dept: FAMILY MEDICINE CLINIC | Age: 45
End: 2020-09-08

## 2020-09-08 NOTE — TELEPHONE ENCOUNTER
Rocio with Dr. Dagoberto Fontaine office called asking if pt was cleared for surgery when seen on 09/02/2020. They are needing documentation faxed to them stating that pt is cleared.     Remi Good may be reached at [phone]    Fax [phone]

## 2020-09-09 NOTE — TELEPHONE ENCOUNTER
Clearance records was faxed twice yesterday and it bounced back- is the fax # correct: [phone]? MOM to return call to office (Dr Tejada Leaver office)  Records are on [employer].

## 2020-11-06 ENCOUNTER — APPOINTMENT (OUTPATIENT)
Dept: PHYSICAL THERAPY | Age: 45
End: 2020-11-06
Payer: COMMERCIAL

## 2020-11-09 ENCOUNTER — HOSPITAL ENCOUNTER (OUTPATIENT)
Dept: PHYSICAL THERAPY | Age: 45
Setting detail: THERAPIES SERIES
Discharge: HOME | End: 2020-11-09
Payer: COMMERCIAL

## 2020-11-16 ENCOUNTER — HOSPITAL ENCOUNTER (OUTPATIENT)
Dept: PHYSICAL THERAPY | Age: 45
Setting detail: THERAPIES SERIES
Discharge: HOME | End: 2020-11-16
Payer: COMMERCIAL

## 2020-11-16 PROCEDURE — 97110 THERAPEUTIC EXERCISES: CPT

## 2020-11-16 PROCEDURE — 97161 PT EVAL LOW COMPLEX 20 MIN: CPT

## 2020-11-16 NOTE — FLOWSHEET NOTE
** PLEASE SIGN, DATE AND TIME CERTIFICATION BELOW AND RETURN TO [facility] OUTPATIENT REHABILITATION (FAX #: [phone]). ATTEST/CO-SIGN IF ACCESSING VIA INCiRBA. THANK YOU.**    I certify that I have examined the patient below and determined that Physical Medicine and Rehabilitation service is necessary and that I approve the established plan of care for up to 90 days or as specifically noted. Attestation, signature or co-signature of physician indicates approval of certification requirements.    ________________________ ____________ __________  Physician Signature   Date   Time  [facility]  PHYSICAL THERAPY  [x] EVALUATION  [] DAILY NOTE (LAND) [] DAILY NOTE (AQUATIC ) [] PROGRESS NOTE [] DISCHARGE NOTE    [x] [facility]   [] [place] [address]    [] [facility]   [] Kelly Amabil    Date: 2020  Patient Name:  Lucille Prabhakar  : 1975  MRN: [id number]  CSN: [id number]    Referring Practitioner Veronica Patel MD   Diagnosis Radiculopathy, lumbar region [M54.16]  Spinal stenosis, lumbar region with neurogenic claudication [M48.062]  Intervertebral disc disorders with radiculopathy, 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]    Treatment Diagnosis Post-op and chronic lumbar and Right leg pain, core and B hip weakness, tightness in hips and back   Date of Evaluation 20    Additional Pertinent History L4-5 discectomy 2007, L4-5 fusion 3/2008, laminectomy L3-4 2020      Functional Outcome Measure Used Oswestry   Functional Outcome Score 16% (20)       Insurance: Primary: Payor: [employer] Grover /  /  / ,   Secondary:    Authorization Information: Aquatics and modalities covered except ionto, no telehealth   Visit # 1, 1/10 for progress note   Visits Allowed: 60 combined PT/OT/ST per calendar year   Recertification Date: 56   Physician Follow-Up: 2020   Physician Orders:    History of Present Illness: S/p L3-4 laminectomy 20. SUBJECTIVE: Pt continues to have pain in right leg with intermittent stabbing pains in right side after standing or walking extended periods of time. Pain worsened when he started working from home and sitting a lot. Pain is better since surgery. Pt wearing LSO brace only when traveling. Pain alleviated with rest. Pt denies use of heat, ice and only taking Tylenol as needed. Social/Functional History and Current Status:  Medications and Allergies have been reviewed and are listed on Medical History Questionnaire. Max Her lives with spouse in a multiple floor home with stairs and no handrail to enter. Task Previous Current   ADLs  Independent Independent   IADL's Independent limiting lifting, otherwise independent   Ambulation Independent Independent   Transfers Independent Independent   Recreation Independent -coaching basketball, playing with 2 sons [address] [employer] Independent   Driving Active  Active    Work [employer].   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     OBJECTIVE:    Pain: Denies current pain, 6/10 at worst with prolonged activity; achiness in right thigh   Palpation    Observation Decreased lumbar lordosis, normal spinal curvature, hips and shoulders level in standing; overweight   Posture        Range of Motion Lumbar flexion standing reaches just past knees d/t hamstring tightness, extension, lateral flexion and rotation limited 25% but no pain  Hip flexion limited 25-50% with lateral hip/thigh pain B, IR WFL with end range tightness, [facility] [place]   Strength Hip 4/5 B  Knee R flexion 4/5, ext 5/5, L 5/5  Ankle df 5/5   Coordination    Sensation Intact    Bed Mobility Independent    Transfers Independent    Ambulation Decreased pace; guarded with decreased arm swing; equal step length   Stairs Reciprocating pattern with light B handrail, no pain   Balance    Special Tests Negative SLR B, positive FADIR right         TREATMENT   Precautions: No heavy lifting   Pain: None currently    X in shaded column indicates activity completed today   Modalities Parameters/  Location  Notes                     Manual Therapy Time/Technique  Notes                     Exercise/Intervention   Notes   Stretches: piriformis (diag KTC), LTR, hamstring stretch with strap 3x 15 sec x    Abdominal bracing and posterior pelvic tilts 10x 5 sec x    DLS: alt march, alt UEs, alt combo UE/LE 10x  x                                                              Specific Interventions Next Treatment: progress core strengthening, manual/modalities as needed    Activity/Treatment Tolerance:  [x]  Patient tolerated treatment well  []  Patient limited by fatigue  []  Patient limited by pain   []  Patient limited by medical complications  []  Other:     Assessment: 41year old presents with chronic and post-op lumbar and right thigh pain causing muscular tightness in hip and hamstrings affecting posture, strength, and activity tolerance. Pt will benefit from PT to address listed impairments. Body Structures/Functions/Activity Limitations: impaired activity tolerance, impaired ROM, impaired strength and pain  Prognosis: good    GOALS:  Patient Goal: Be painfree    Short Term Goals:  Time Frame: 6 weeks  Patient will report reduced pain to 0/10 to tolerate sitting, standing, and walking longer durations. Patient will demonstrate good core engagement and postural awareness during therapy session without cues to carry over to functional activities, lifting, and housework. Patient will have minimal to no lumbar and hip AROM restriction for ease bending over and completing ADLs. Patient will improve B hip strength to 5/5 for stabilization when walking, lifting, and standing. Long Term Goals:  Time Frame: 12 weeks  Patient will be independent and compliant with HEP daily to achieve above goals.    Pt will reduce Oswestry score from 16% to 8% to tolerate prolonged activities. Patient Education:   [x]  HEP/Education Completed: Plan of Care, Goals, attendance policy, exercises completed above with handout provided  [address]  []  No new Education completed  []  Reviewed Prior HEP      [x]  Patient verbalized and/or demonstrated understanding of education provided. []  Patient unable to verbalize and/or demonstrate understanding of education provided. Will continue education. []  Barriers to learning:     PLAN:  Treatment Recommendations: Strengthening, Range of Motion, Manual Therapy - Soft Tissue Mobilization, Home Exercise Program, Patient Education and Modalities    [x]  Plan of care initiated. Plan to see patient 2 times per week for 12 weeks to address the treatment planned outlined above.   []  Continue with current plan of care  []  Modify plan of care as follows:    []  Hold pending physician visit  []  Discharge    Time In 1545   Time Out 1622   Timed Code Minutes: 10 min   Total Treatment Time: 37 min       Electronically Signed by: Pauline Marie

## 2020-11-19 ENCOUNTER — HOSPITAL ENCOUNTER (OUTPATIENT)
Dept: PHYSICAL THERAPY | Age: 45
Setting detail: THERAPIES SERIES
Discharge: HOME | End: 2020-11-19
Payer: COMMERCIAL

## 2020-11-19 PROCEDURE — 97110 THERAPEUTIC EXERCISES: CPT

## 2020-11-19 NOTE — PROGRESS NOTES
[address] [facility]  PHYSICAL THERAPY  [x] DAILY NOTE (LAND) [] DAILY NOTE (AQUATIC ) [] PROGRESS NOTE [] DISCHARGE NOTE    [x] OUTPATIENT REHABILITATION CENTER [facility]   [] [place] [address]    [] [facility]   [] [facility]    Date: 2020  Patient Name:  Nevaeh Man  : 1975  MRN: [id number]  CSN: [id number]    Referring Practitioner Nemo Haynes MD   Diagnosis Radiculopathy, lumbar region [M54.16]  Spinal stenosis, lumbar region with neurogenic claudication [M48.062]  Intervertebral disc disorders with radiculopathy, 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]    Treatment Diagnosis Post-op and chronic lumbar and Right leg pain, core and B hip weakness, tightness in hips and back   Date of Evaluation 20    Additional Pertinent History L4-5 discectomy 2007, L4-5 fusion 3/2008, laminectomy L3-4 2020      Functional Outcome Measure Used Oswestry   Functional Outcome Score 16% (20)       Insurance: Primary: Payor: [facility] /  /  / ,   Secondary:    Authorization Information: Aquatics and modalities covered except ionto, no telehealth   Visit # 2, 2/10 for progress note   Visits Allowed: 60 combined PT/OT/ST per calendar year   Recertification Date: 52   Physician Follow-Up: 2020   Physician Orders:    History of Present Illness: S/p L3-4 laminectomy 20. SUBJECTIVE: Patient reporting a little bit of low back soreness on right side following evaluation but currently reporting pain level 2/10.      TREATMENT   Precautions: No heavy lifting   Pain: 2/10 low back    X in shaded column indicates activity completed today   Modalities Parameters/  Location  Notes                     Manual Therapy Time/Technique  Notes                     Exercise/Intervention   Notes   Stretches: piriformis (diag KTC), LTR, hamstring stretch with strap 3 15 sec x    90/90 Hamstring stretch with towel  10 3 sec x    Abdominal bracing and posterior pelvic tilts 10 5 sec x DLS: alt march, alt UEs, alt combo UE/LE 10  x             Hip adduction with ball 10 5 sec x             SLR  10 No hold x    Side lying hip abduction and clam shell 10 each No hold  x    Prone hip extension  10  x Reporting a little pain on right side    Abdominal bracing with LAQ  10  x           Seated with bracing scapular retraction and horizontal abd  10 each  x             Specific Interventions Next Treatment: progress core strengthening, manual/modalities as needed    Activity/Treatment Tolerance:  [x]  Patient tolerated treatment well  []  Patient limited by fatigue  []  Patient limited by pain   []  Patient limited by medical complications  []  Other:     Assessment: Progressed with more strengthening exercises as noted with patient tolerating well. Patient only reporting having a little bit of discomfort with prone hip extension. Did add a few exercises for posture with patient tolerating well. Patient reporting pain level 2/10 at end of session and having no other complains. GOALS:  Patient Goal: Be pain free    Short Term Goals:  Time Frame: 6 weeks  Patient will report reduced pain to 0/10 to tolerate sitting, standing, and walking longer durations. Patient will demonstrate good core engagement and postural awareness during therapy session without cues to carry over to functional activities, lifting, and housework. Patient will have minimal to no lumbar and hip AROM restriction for ease bending over and completing ADL's. Patient will improve B hip strength to 5/5 for stabilization when walking, lifting, and standing. Long Term Goals:  Time Frame: 12 weeks  Patient will be independent and compliant with HEP daily to achieve above goals. Pt will reduce Oswestry score from 16% to 8% to tolerate prolonged activities. Patient Education:   [x]  HEP/Education Completed: hand out for SLR, hip abd, clam shell, hip adduction with ball.  Scap retraction and horizontal abdu with bracing  [employer] Access Code:[email or web address]     []  No new Education completed  []  Reviewed Prior HEP      [x]  Patient verbalized and/or demonstrated understanding of education provided. []  Patient unable to verbalize and/or demonstrate understanding of education provided. Will continue education. []  Barriers to learning:     PLAN:  Treatment Recommendations: Strengthening, Range of Motion, Manual Therapy - Soft Tissue Mobilization, Home Exercise Program, Patient Education and Modalities      [x]  Continue with current plan of care. 2 times per week for 12 weeks.   []  Modify plan of care as follows:    []  Hold pending physician visit  []  Discharge    Time In 1602   Time Out 1633   Timed Code Minutes: 31 min   Total Treatment Time: 31 min       Electronically Signed by: Corrina Maldonado

## 2020-11-24 ENCOUNTER — HOSPITAL ENCOUNTER (OUTPATIENT)
Dept: PHYSICAL THERAPY | Age: 45
Setting detail: THERAPIES SERIES
Discharge: HOME | End: 2020-11-24
Payer: COMMERCIAL

## 2020-11-24 PROCEDURE — 97110 THERAPEUTIC EXERCISES: CPT

## 2020-11-24 NOTE — PROGRESS NOTES
lBanca Fisher is a 25 year old female here today for her first prenatal visit. Patient's last menstrual period was 2018..  She has been experiencing mild breast tenderness as well as mild queasiness. She has had several episodes of spotting or bleeding. She is a      .    Her past medical history is  significant for previous term  delivery for CPD.    Visit Vitals  BP 90/58   Pulse 68   Ht 5' 5\" (1.651 m)   Wt 75.7 kg   LMP 2018   BMI 27.76 kg/m²   , Body mass index is 27.76 kg/m².;  On physical examination this is a well-developed well-nourished young white female who appears her chronological age. She is alert oriented x3, affect is appropriate.   Skin is warm and dry without apparent lesions.  HEENT reveals no apparent lymphadenopathy or thyromegaly.  Lungs are clear to auscultation and percussion.  Cardiac exam reveals regular rate and rhythm without apparent murmur click or rub.  Breasts are without obvious or dominant mass. There is no skin retraction, nipple discharge, axillary abnormalities.  Abdomen is soft nontender without obvious or dominant hepatosplenomegaly or defect. A uterine fundal height cannot be palpated abdominally.  Pelvic examination reveals normal-appearing external genitalia. The periurethral tissues and vaginal mucosa are without abnormalities. The cervix is without visible lesions. There are no obvious cervical abnormalities seen. Bimanual pelvic examination reveals the uterus to be consistent with 8-9 weeks size. Pelvimetry is clinically adequate.    Overall assessment is that of early pregnancy, size consistent with dates.    Plan:  Blanca declines first trimester screening.  She has already had 2 ultrasounds which confirmed viability.  She will undergo all routine lab testing today and return in 4 weeks for ongoing prenatal care    
response to progressions. [employer] Access Code:     []  No new Education completed  []  Reviewed Prior HEP      [x]  Patient verbalized and/or demonstrated understanding of education provided. []  Patient unable to verbalize and/or demonstrate understanding of education provided. Will continue education. []  Barriers to learning:     PLAN:  Treatment Recommendations: Strengthening, Range of Motion, Manual Therapy - Soft Tissue Mobilization, Home Exercise Program, Patient Education and Modalities      [x]  Continue with current plan of care. 2 times per week for 12 weeks.   []  Modify plan of care as follows:    []  Hold pending physician visit  []  Discharge    Time In 1538   Time Out 1628   Timed Code Minutes: 50 min   Total Treatment Time: 50 min       Electronically Signed by: Nikole Dueñas

## 2020-12-01 ENCOUNTER — APPOINTMENT (OUTPATIENT)
Dept: PHYSICAL THERAPY | Age: 45
End: 2020-12-01
Payer: COMMERCIAL

## 2020-12-03 ENCOUNTER — APPOINTMENT (OUTPATIENT)
Dept: PHYSICAL THERAPY | Age: 45
End: 2020-12-03
Payer: COMMERCIAL

## 2020-12-08 ENCOUNTER — APPOINTMENT (OUTPATIENT)
Dept: PHYSICAL THERAPY | Age: 45
End: 2020-12-08
Payer: COMMERCIAL

## 2020-12-10 ENCOUNTER — APPOINTMENT (OUTPATIENT)
Dept: PHYSICAL THERAPY | Age: 45
End: 2020-12-10
Payer: COMMERCIAL

## 2020-12-14 ENCOUNTER — HOSPITAL ENCOUNTER (OUTPATIENT)
Dept: PHYSICAL THERAPY | Age: 45
Setting detail: THERAPIES SERIES
Discharge: HOME | End: 2020-12-14
Payer: COMMERCIAL

## 2020-12-14 PROCEDURE — 97110 THERAPEUTIC EXERCISES: CPT

## 2020-12-14 NOTE — DISCHARGE SUMMARY
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]    Treatment Diagnosis Post-op and chronic lumbar and Right leg pain, core and B hip weakness, tightness in hips and back   Date of Evaluation 20    Additional Pertinent History L4-5 discectomy 2007, L4-5 fusion 3/2008, laminectomy L3-4 2020      Functional Outcome Measure Used Oswestry   Functional Outcome Score 16% (20) 0% (20)      Insurance: Primary: Payor: [facility] /  /  / ,   Secondary:    Authorization Information: Aquatics and modalities covered except ionto, no telehealth   Visit # 4, 4/10 for progress note   Visits Allowed: 60 combined PT/OT/ST per calendar year   Recertification Date:    Physician Follow-Up: 2020   Physician Orders:    History of Present Illness: S/p L3-4 laminectomy 20. SUBJECTIVE: Patient reports he is feeling better since jacobo COVID. Pt hasn't had any back pain, just muscle soreness from exercises. Pt feels ready to finish up with therapy.      TREATMENT   Precautions: No heavy lifting   Pain: 0/10 low back    X in shaded column indicates activity completed today   Modalities Parameters/  Location  Notes                     Manual Therapy Time/Technique  Notes                     Exercise/Intervention   Notes   Stretches: piriformis (diag KTC), LTR 3 15 sec x Reviewed for HEP   90/90 Hamstring stretch with towel  3 15 sec     Abdominal bracing and posterior pelvic tilts 10 5 sec     DLS: alt march, alt UEs, alt combo UE/LE 15               Shoulder flexion and chop R/L with red weighted ball  15               Hip abduction bilateral and unilateral  15 5  Green band            Hip adduction with ball 15 5 sec              SLR  2 x 10 No hold     Side lying hip abduction and clam shell 2x10 each No hold      Prone hip extension (knee flexed and extended)  10      Abdominal bracing with LAQ and HS curl  15   Green band used for HS curl          Seated on silver disc: horizontal abd, diagonals L/R, rows and extension3. Marching. Alt marching and Shoulder flexion  10               Specific Interventions Next Treatment: progress core strengthening, manual/modalities as needed    Activity/Treatment Tolerance:  [x]  Patient tolerated treatment well  []  Patient limited by fatigue  []  Patient limited by pain   []  Patient limited by medical complications  []  Other:     Assessment: Pt demos good progress toward goals. Pt continues to have mild hip weakness and decreased flexibility. Pt notes zero functional limitations on Oswestry. Pt requesting discharge at this time. GOALS:  Patient Goal: Be pain free    Short Term Goals:  Time Frame: 6 weeks  Patient will report reduced pain to 0/10 to tolerate sitting, standing, and walking longer durations. GOAL MET:  Pt denies pain after walking around the mall [place] shopping the other day. Discontinue Goal    Patient will demonstrate good core engagement and postural awareness during therapy session without cues to carry over to functional activities, lifting, and housework. GOAL MET:  Pt verbalizes awareness of abdominal bracing and posture at home. Discontinue Goal    Patient will have minimal to no lumbar and hip AROM restriction for ease bending over and completing ADL's. GOAL NOT MET: Pt demos minimal lumbar/hip tightness and moderate hamstring tightness, which is baseline.   Discontinue Goal    Patient

## 2020-12-17 ENCOUNTER — APPOINTMENT (OUTPATIENT)
Dept: PHYSICAL THERAPY | Age: 45
End: 2020-12-17
Payer: COMMERCIAL

## 2020-12-21 ENCOUNTER — APPOINTMENT (OUTPATIENT)
Dept: PHYSICAL THERAPY | Age: 45
End: 2020-12-21
Payer: COMMERCIAL

## 2021-04-27 DIAGNOSIS — J01.90 ACUTE SINUSITIS, RECURRENCE NOT SPECIFIED, UNSPECIFIED LOCATION: ICD-10-CM

## 2021-04-27 DIAGNOSIS — J20.9 ACUTE BRONCHITIS, UNSPECIFIED ORGANISM: ICD-10-CM

## 2021-04-27 RX ORDER — GUAIFENESIN AND CODEINE PHOSPHATE 100; 10 MG/5ML; MG/5ML
10 SOLUTION ORAL 4 TIMES DAILY PRN
Qty: 150 ML | Refills: 0 | Status: SHIPPED | OUTPATIENT
Start: 2021-04-27 | End: 2021-05-04 | Stop reason: SDUPTHER

## 2021-05-04 ENCOUNTER — VIRTUAL VISIT (OUTPATIENT)
Dept: FAMILY MEDICINE CLINIC | Age: 46
End: 2021-05-04

## 2021-05-04 DIAGNOSIS — J01.90 ACUTE SINUSITIS, RECURRENCE NOT SPECIFIED, UNSPECIFIED LOCATION: ICD-10-CM

## 2021-05-04 DIAGNOSIS — J01.20 ACUTE NON-RECURRENT ETHMOIDAL SINUSITIS: Primary | ICD-10-CM

## 2021-05-04 DIAGNOSIS — J20.9 ACUTE BRONCHITIS, UNSPECIFIED ORGANISM: ICD-10-CM

## 2021-05-04 PROCEDURE — 99213 OFFICE O/P EST LOW 20 MIN: CPT | Performed by: FAMILY MEDICINE

## 2021-05-04 RX ORDER — FEXOFENADINE HCL AND PSEUDOEPHEDRINE HCI 60; 120 MG/1; MG/1
1 TABLET, EXTENDED RELEASE ORAL 2 TIMES DAILY
Qty: 20 TABLET | Refills: 0 | Status: SHIPPED | OUTPATIENT
Start: 2021-05-04 | End: 2022-03-04 | Stop reason: CLARIF

## 2021-05-04 RX ORDER — AMOXICILLIN AND CLAVULANATE POTASSIUM 875; 125 MG/1; MG/1
1 TABLET, FILM COATED ORAL 2 TIMES DAILY
Qty: 20 TABLET | Refills: 0 | Status: SHIPPED | OUTPATIENT
Start: 2021-05-04 | End: 2021-05-14

## 2021-05-04 RX ORDER — GUAIFENESIN AND CODEINE PHOSPHATE 100; 10 MG/5ML; MG/5ML
10 SOLUTION ORAL 4 TIMES DAILY PRN
Qty: 150 ML | Refills: 0 | Status: SHIPPED | OUTPATIENT
Start: 2021-05-04 | End: 2022-01-10 | Stop reason: SDUPTHER

## 2021-05-04 SDOH — ECONOMIC STABILITY: TRANSPORTATION INSECURITY
IN THE PAST 12 MONTHS, HAS THE LACK OF TRANSPORTATION KEPT YOU FROM MEDICAL APPOINTMENTS OR FROM GETTING MEDICATIONS?: NO

## 2021-05-04 SDOH — ECONOMIC STABILITY: INCOME INSECURITY: HOW HARD IS IT FOR YOU TO PAY FOR THE VERY BASICS LIKE FOOD, HOUSING, MEDICAL CARE, AND HEATING?: NOT HARD AT ALL

## 2021-05-04 ASSESSMENT — ENCOUNTER SYMPTOMS
BACK PAIN: 0
EYE PAIN: 0
CONSTIPATION: 0
ABDOMINAL PAIN: 0
SORE THROAT: 0
NAUSEA: 0
COUGH: 1
CHEST TIGHTNESS: 0
SHORTNESS OF BREATH: 0
TROUBLE SWALLOWING: 0
BLOOD IN STOOL: 0

## 2021-05-04 ASSESSMENT — PATIENT HEALTH QUESTIONNAIRE - PHQ9
2. FEELING DOWN, DEPRESSED OR HOPELESS: 0
1. LITTLE INTEREST OR PLEASURE IN DOING THINGS: 0
SUM OF ALL RESPONSES TO PHQ QUESTIONS 1-9: 0

## 2021-05-04 NOTE — PROGRESS NOTES
Addis Mcfarlane agreed to Video Chat/Exam in presence of Dr Abdirahman Christopher and myself. Verified who was present in room with Addis Mcfarlane. Addis Mcfarlane informed the e-mail address used to [facility] cannot be used to contact the Provider, if they are any questions or concerns they need to call the office directly. Addis Mcfarlane stated understanding.

## 2022-01-10 DIAGNOSIS — J20.9 ACUTE BRONCHITIS, UNSPECIFIED ORGANISM: ICD-10-CM

## 2022-01-10 DIAGNOSIS — J01.90 ACUTE SINUSITIS, RECURRENCE NOT SPECIFIED, UNSPECIFIED LOCATION: ICD-10-CM

## 2022-01-10 RX ORDER — GUAIFENESIN AND CODEINE PHOSPHATE 100; 10 MG/5ML; MG/5ML
10 SOLUTION ORAL 4 TIMES DAILY PRN
Qty: 150 ML | Refills: 0 | Status: SHIPPED | OUTPATIENT
Start: 2022-01-10 | End: 2022-01-17

## 2022-03-04 ENCOUNTER — OFFICE VISIT (OUTPATIENT)
Dept: FAMILY MEDICINE CLINIC | Age: 47
End: 2022-03-04

## 2022-03-04 VITALS
DIASTOLIC BLOOD PRESSURE: 80 MMHG | HEIGHT: 72 IN | BODY MASS INDEX: 38.62 KG/M2 | HEART RATE: 68 BPM | WEIGHT: 285.13 LBS | RESPIRATION RATE: 16 BRPM | SYSTOLIC BLOOD PRESSURE: 136 MMHG | TEMPERATURE: 98.3 F

## 2022-03-04 DIAGNOSIS — Z00.00 WELL ADULT EXAM: Primary | ICD-10-CM

## 2022-03-04 DIAGNOSIS — Z12.11 SCREEN FOR COLON CANCER: ICD-10-CM

## 2022-03-04 DIAGNOSIS — R35.0 URINARY FREQUENCY: ICD-10-CM

## 2022-03-04 DIAGNOSIS — H61.23 BILATERAL IMPACTED CERUMEN: ICD-10-CM

## 2022-03-04 LAB
BACTERIA URINE, POC: NORMAL
BILIRUBIN URINE: 17 MG/DL
BLOOD, URINE: NEGATIVE
CASTS URINE, POC: NORMAL
CLARITY: CLEAR
COLOR: YELLOW
CRYSTALS URINE, POC: NORMAL
EPI CELLS URINE, POC: NORMAL
GLUCOSE URINE: NORMAL
KETONES, URINE: NEGATIVE
LEUKOCYTE EST, POC: NORMAL
NITRITE, URINE: NEGATIVE
PH UA: 7.5 (ref 4.5–8)
PROTEIN UA: NEGATIVE
RBC URINE, POC: NORMAL
SPECIFIC GRAVITY UA: 1.01 (ref 1–1.03)
UROBILINOGEN, URINE: NORMAL
WBC URINE, POC: NORMAL
YEAST URINE, POC: NORMAL

## 2022-03-04 PROCEDURE — 69210 REMOVE IMPACTED EAR WAX UNI: CPT | Performed by: FAMILY MEDICINE

## 2022-03-04 PROCEDURE — 99396 PREV VISIT EST AGE 40-64: CPT | Performed by: FAMILY MEDICINE

## 2022-03-04 PROCEDURE — 81000 URINALYSIS NONAUTO W/SCOPE: CPT | Performed by: FAMILY MEDICINE

## 2022-03-04 ASSESSMENT — ENCOUNTER SYMPTOMS
SINUS PRESSURE: 0
SHORTNESS OF BREATH: 0
CONSTIPATION: 0

## 2022-03-04 NOTE — PROGRESS NOTES
Mary Jane Guerrero (:  1975) is a 56 y.o. male,Established patient, here for evaluation of the following chief complaint(s):  Urinary Frequency         ASSESSMENT/PLAN:      Diagnosis Orders   1. Well adult exam     2. Urinary frequency  POC URINE with Microscopic   3. Screen for colon cancer  [facility] - Chris Luke MD, Gastroenterology, [facility]   4. Bilateral impacted cerumen  24924 - ID REMOVE IMPACTED EAR WAX       See me in a year  Stop the spicy foods and the caffeine use for at least 10 days and if the bladder troubles continue we can consult the [facility] group    Subjective   SUBJECTIVE/OBJECTIVE:  HPI  1. There has been some frequency about a month ago and there is an odor the past week. There is some urgency but no blood. 2. No past episodes  3. No new foods or vits. Some decrease in sexual activity. 4. No change in the caffeine use and no decongestants. He has increased the spicey foods since the Superbowl. Review of Systems   Constitutional: Negative for fatigue. HENT: Negative for sinus pressure. Eyes: Negative for visual disturbance. Respiratory: Negative for shortness of breath. Cardiovascular: Negative for chest pain. Gastrointestinal: Negative for constipation. Genitourinary: Positive for enuresis (just a few drops), frequency and urgency. Negative for difficulty urinating and dysuria. Increased nocturia   Musculoskeletal: Negative for arthralgias. Skin: Negative for rash. Neurological: Negative for headaches. The patient's medications, allergies, past medical problems, surgical, social, and family histories were reviewed and updated as needed. Objective   Physical Exam  Constitutional:       General: He is not in acute distress. Appearance: He is well-developed. HENT:      Head: Normocephalic and atraumatic. Right Ear: External ear normal. There is impacted cerumen. Left Ear: External ear normal. There is impacted cerumen.       Ears: Comments: Wax was removed from the affected ear(s) by myself, using the curette. Examination of the canal(s) by myself showed no sign of injury afterward. Nose: Nose normal.      Mouth/Throat:      Pharynx: No oropharyngeal exudate. Eyes:      General: No scleral icterus. Conjunctiva/sclera: Conjunctivae normal.   Neck:      Thyroid: No thyromegaly. Vascular: No carotid bruit. Trachea: No tracheal deviation. Cardiovascular:      Rate and Rhythm: Normal rate and regular rhythm. Heart sounds: Normal heart sounds. Pulmonary:      Effort: Pulmonary effort is normal.      Breath sounds: Normal breath sounds. Abdominal:      General: Bowel sounds are normal.      Palpations: Abdomen is soft. There is no mass. Hernia: There is no hernia in the left inguinal area or right inguinal area. Genitourinary:     Penis: Normal and uncircumcised. Testes: Normal.      Prostate: Normal.      Rectum: Normal.   Musculoskeletal:      Cervical back: Neck supple. Lymphadenopathy:      Cervical: No cervical adenopathy. Skin:     General: Skin is warm and dry. Neurological:      Mental Status: He is alert and oriented to person, place, and time. Psychiatric:         Behavior: Behavior normal.     Blood pressure 136/80, pulse 68, temperature 98.3 °F (36.8 °C), temperature source Skin, resp. rate 16, height 6' (1.829 m), weight 285 lb 2 oz (129.3 kg).     Results for orders placed or performed in visit on 03/04/22   POC URINE with Microscopic   Result Value Ref Range    Color, UA Yellow     Clarity, UA Clear Clear    Glucose, Ur neg     Bilirubin Urine 17 mg/dL    Ketones, Urine Negative     Specific Gravity, UA 1.015 1.005 - 1.030    Blood, Urine Negative     pH, UA 7.5 4.5 - 8.0    Protein, UA Negative Negative    Nitrite, Urine Negative     Leukocytes, UA neg     Urobilinogen, Urine 0.2 mg/dL     rbc urine, poc nond     wbc urine, poc none     bacteria urine, poc none     yeast urine, poc casts urine, poc      Epi Cells Urine, POC none     crystals urine, poc                      An electronic signature was used to authenticate this note.     --Judith Andrade MD

## 2022-03-04 NOTE — PATIENT INSTRUCTIONS
See me in a year  Stop the spicy foods and the caffeine use for at least 10 days and if the bladder troubles continue we can consult the [facility] group

## 2022-04-05 DIAGNOSIS — R35.0 URINARY FREQUENCY: Primary | ICD-10-CM

## 2022-05-06 ENCOUNTER — OFFICE VISIT (OUTPATIENT)
Dept: UROLOGY | Age: 47
End: 2022-05-06
Payer: COMMERCIAL

## 2022-05-06 VITALS
DIASTOLIC BLOOD PRESSURE: 74 MMHG | BODY MASS INDEX: 38.47 KG/M2 | WEIGHT: 284 LBS | SYSTOLIC BLOOD PRESSURE: 124 MMHG | HEIGHT: 72 IN

## 2022-05-06 DIAGNOSIS — N48.1 BALANITIS: ICD-10-CM

## 2022-05-06 DIAGNOSIS — R35.0 URINARY FREQUENCY: Primary | ICD-10-CM

## 2022-05-06 LAB
BILIRUBIN URINE: NEGATIVE
BLOOD URINE, POC: NEGATIVE
CHARACTER, URINE: CLEAR
COLOR, URINE: YELLOW
GLUCOSE URINE: NEGATIVE MG/DL
KETONES, URINE: NEGATIVE
LEUKOCYTE CLUMPS, URINE: NEGATIVE
NITRITE, URINE: NEGATIVE
PH, URINE: 5.5 (ref 5–9)
POST VOID RESIDUAL (PVR): 0 ML
PROTEIN, URINE: NEGATIVE MG/DL
SPECIFIC GRAVITY, URINE: <= 1.005 (ref 1–1.03)
UROBILINOGEN, URINE: 0.2 EU/DL (ref 0–1)

## 2022-05-06 PROCEDURE — 81003 URINALYSIS AUTO W/O SCOPE: CPT | Performed by: UROLOGY

## 2022-05-06 PROCEDURE — 99204 OFFICE O/P NEW MOD 45 MIN: CPT | Performed by: UROLOGY

## 2022-05-06 PROCEDURE — 51798 US URINE CAPACITY MEASURE: CPT | Performed by: UROLOGY

## 2022-05-06 RX ORDER — DOXYCYCLINE 100 MG/1
100 CAPSULE ORAL 2 TIMES DAILY
Qty: 14 CAPSULE | Refills: 0 | Status: SHIPPED | OUTPATIENT
Start: 2022-05-06 | End: 2022-05-13

## 2022-05-06 NOTE — PROGRESS NOTES
Dorys Mitchell MD.    [address]  [address] [place] [address]  Dept: [phone]  Dept Fax: [phone]  Loc: [address] [facility] Urology Office Note -     Patient:  Robi Schneider  YOB: 1975    The patient is a 56 y.o. male who presents today for evaluation of the following problems:   Chief Complaint   Patient presents with    New Patient     referred by Yasemin Garrett MD    Urinary Frequency    referred/consultation requested by Yasemin Garrett MD.    History of Present Illness:    LUTS  Some freq/urgency of void  Some incontinence at night  Foul odor   No dysuria  No hematuria  Not sexually active    Phimosis  Balanitis  There is discharge    Requested/reviewed records from Yasemin Garrett MD office and/or outside [de-identified]    (Patient's old records have been requested, reviewed and pertinent findings summarized in today's note.)    Procedures Today: N/A      Last several PSA's:  No results found for: PSA    Last total testosterone:  No results found for: TESTOSTERONE    Urinalysis today:  Results for POC orders placed in visit on 05/06/22   POCT Urinalysis No Micro (Auto)   Result Value Ref Range    Glucose, Ur Negative NEGATIVE mg/dl    Bilirubin Urine Negative     Ketones, Urine Negative NEGATIVE    Specific Gravity, Urine <= 1.005 1.002 - 1.030    Blood, UA POC Negative NEGATIVE    pH, Urine 5.50 5.0 - 9.0    Protein, Urine Negative NEGATIVE mg/dl    Urobilinogen, Urine 0.20 0.0 - 1.0 eu/dl    Nitrite, Urine Negative NEGATIVE    Leukocyte Clumps, Urine Negative NEGATIVE    Color, Urine Yellow YELLOW-STRAW    Character, Urine Clear CLR-SL.CLOUD   poct post void residual   Result Value Ref Range    post void residual 0 ml       Last BUN and creatinine:  Lab Results   Component Value Date    BUN 16 08/29/2020     Lab Results   Component Value Date    CREATININE 1.01 08/29/2020         Imaging Reviewed during this Office Visit:   Sakshi Brennan MD independently reviewed the images and verified the radiology reports from:    No results found. PAST MEDICAL, FAMILY AND SOCIAL HISTORY:  Past Medical History:   Diagnosis Date    COVID-19 virus infection 12/2020    Obesity, Class II, BMI 35-39.9, no comorbidity     Tobacco dependence in remission      Past Surgical History:   Procedure Laterality Date    ARM SURGERY  03/2016    [facility] doctor - possibly Dr Ingrid Mathews  2007, 2008    L 4-5    VASECTOMY  2013     Family History   Problem Relation Age of Onset    Heart Disease Father      Outpatient Medications Marked as Taking for the 5/6/22 encounter (Office Visit) with Jamilah Norris MD   Medication Sig Dispense Refill    Ascorbic Acid (VITAMIN C) 500 MG tablet Take 500 mg by mouth daily      Multiple Vitamins-Minerals (THERAPEUTIC MULTIVITAMIN-MINERALS) tablet Take 1 tablet by mouth daily. Patient has no known allergies. Social History     Tobacco Use   Smoking Status Former Smoker    Types: Cigarettes    Quit date: 5/24/2011    Years since quitting: 10.9   Smokeless Tobacco Never Used      (If patient a smoker, smoking cessation counseling offered)   Social History     Substance and Sexual Activity   Alcohol Use No       REVIEW OF SYSTEMS:  Constitutional: negative  Eyes: negative  Respiratory: negative  Cardiovascular: negative  Gastrointestinal: negative  Genitourinary: see HPI  Musculoskeletal: negative  Skin: negative   Neurological: negative  Hematological/Lymphatic: negative  Psychological: negative      Physical Exam:    This a 56 y.o. male  Vitals:    05/06/22 1236   BP: 124/74     Body mass index is 38.52 kg/m². Constitutional: Patient in no acute distress; Neuro: alert and oriented to person place and time. Psych: Mood and affect normal.  Skin: warm, dry  Lungs: Respiratory effort normal, Symmetric chest rise  Cardiovascular:  Normal peripheral pulses;  Regular rate, radial pulses palpable  Abdomen: Soft, non-tender, non-distended with no CVA, flank pain, hepatosplenomegaly or hernia. Kidneys normal.  Bladder non-tender and not distended. Lymphatics: no palpable lymphadenopathy  Minimal/no edema in bilateral lower extremities  Balanitis+     Assessment and Plan        1. Urinary frequency    2. Balanitis               Plan: Will treat for urethritis and balanitis  Doxy/mycolog  Follow up in 2-3 months. If no improvement, may need circumcision. Prescriptions Ordered:  No orders of the defined types were placed in this encounter.      Orders Placed:  Orders Placed This Encounter   Procedures    POCT Urinalysis No Micro (Auto)    poct post void residual     Bladder scan            DENISE Flores MD

## 2022-07-12 PROBLEM — D12.6 ADENOMATOUS COLON POLYP: Status: ACTIVE | Noted: 2022-05-27

## 2022-07-27 ENCOUNTER — OFFICE VISIT (OUTPATIENT)
Dept: UROLOGY | Age: 47
End: 2022-07-27
Payer: COMMERCIAL

## 2022-07-27 VITALS
HEIGHT: 72 IN | WEIGHT: 279 LBS | SYSTOLIC BLOOD PRESSURE: 142 MMHG | BODY MASS INDEX: 37.79 KG/M2 | DIASTOLIC BLOOD PRESSURE: 80 MMHG

## 2022-07-27 DIAGNOSIS — R35.0 URINARY FREQUENCY: Primary | ICD-10-CM

## 2022-07-27 DIAGNOSIS — N48.1 BALANITIS: ICD-10-CM

## 2022-07-27 PROCEDURE — 99214 OFFICE O/P EST MOD 30 MIN: CPT | Performed by: UROLOGY

## 2022-07-27 NOTE — PROGRESS NOTES
Kita Valenzuela MD.    [facility] [address]  [facility] [address]  Dept: [phone]  Dept Fax: [phone]  Loc: [address] [facility] Urology Office Note -     Patient:  Jay Pickering  YOB: 1975    The patient is a 56 y.o. male who presents today for evaluation of the following problems:   Chief Complaint   Patient presents with    Follow-up     Balanitis     Urinary Frequency    referred/consultation requested by Lawrence Gonzales MD.    History of Present Illness:    LUTS  Urethritis treated with doxy  Some freq/urgency of void-- worse at night  No foul smell  No dysuria  No hematuria  Not sexually active    Phimosis  Balanitis improved with mycolog cream    Requested/reviewed records from Lawrence Gonzales MD office and/or outside [de-identified]    (Patient's old records have been requested, reviewed and pertinent findings summarized in today's note.)    Procedures Today: N/A      Last several PSA's:  No results found for: PSA    Last total testosterone:  No results found for: TESTOSTERONE    Urinalysis today:  No results found for this visit on 07/27/22. Last BUN and creatinine:  Lab Results   Component Value Date    BUN 16 08/29/2020     Lab Results   Component Value Date    CREATININE 1.01 08/29/2020         Imaging Reviewed during this Office Visit:   Collette Moose, MD independently reviewed the images and verified the radiology reports from:    No results found.     PAST MEDICAL, FAMILY AND SOCIAL HISTORY:  Past Medical History:   Diagnosis Date    Adenomatous colon polyp 05/27/2022    COVID-19 virus infection 12/2020    Obesity, Class II, BMI 35-39.9, no comorbidity     Tobacco dependence in remission      Past Surgical History:   Procedure Laterality Date    ARM SURGERY  03/2016    [facility] doctor - possibly Dr Kirsty Garcia  2007, 2008    L 4-5    VASECTOMY  2013     Family History   Problem Relation Age

## 2022-08-11 ENCOUNTER — TELEPHONE (OUTPATIENT)
Dept: UROLOGY | Age: 47
End: 2022-08-11

## 2022-08-11 DIAGNOSIS — Z01.818 PRE-OP TESTING: ICD-10-CM

## 2022-08-11 DIAGNOSIS — R35.0 URINARY FREQUENCY: Primary | ICD-10-CM

## 2022-08-11 DIAGNOSIS — N48.1 BALANITIS: ICD-10-CM

## 2022-08-11 NOTE — TELEPHONE ENCOUNTER
SURGERY [address]  [facility] [address] [place], [address]      Phone *[phone] *[phone]   Surgical Scheduling Direct Line Phone *[phone] Fax *[phone]      Jamir Gil 1975 male    [address] [facility]   Marital Status:          Home Phone: [phone]      Cell Phone:    Telephone Information:   Mobile [phone]          Surgeon: Dr. Ca Edmonds Surgery Date: 9/2/2022   Time: 8:00am    Procedure: Circumcision    Diagnosis: Balanitis     Important Medical History:  In Epic    Special Inst/Equip: Regular    CPT Codes:    95859  Latex Allergy: No     Cardiac Device:  No    Anesthesia:  General          Admission Type:  Same Day                        Admit Prior to Day of Surgery: No    Case Location:  Main OR            Preadmission Testing:  Phone Call          PAT Date and Time:______________________________________________________    PAT Confirmation #: ______________________________________________________    Post Op Visit: ___________________________________________________________    Need Preop Cardiac Clearance: Yes    Does Patient have Cardiologist/physician?      Dr. Priyanka Blanco    Surgery Confirmation #: __________________________________________________    Sadny Osorio: ________________________   Date: __________________________     [employer] Name: Frankie Pozo

## 2022-08-11 NOTE — TELEPHONE ENCOUNTER
Patient is scheduled for a Circumcision with Dr. Roya Alexander on 9/2/2022. We are requesting clearance from Dr. Maryam Huber. Thank you.

## 2022-08-11 NOTE — TELEPHONE ENCOUNTER
Patient scheduled with Dr. Jeff Castano on 9/2/2022. Surgery consent to be done upon arrival.  Dr. Chas Blunt to clear. Patient to do urine culture, fasting labs and EKG on 8/19/2022; orders mailed to patient. Patient will have an adult over the age of 25 with them at discharge and 24 hours after procedure. Surgery instructions mailed to patient.

## 2022-08-12 ENCOUNTER — TELEPHONE (OUTPATIENT)
Dept: UROLOGY | Age: 47
End: 2022-08-12

## 2022-08-12 NOTE — TELEPHONE ENCOUNTER
Pt returned call. [facility] for some pre-op appts on 8/19, hoping to make something work that day. He works at [employer] in [employer] and would like to accomplish everything in one day if possible. Will discuss with Rabia on Monday.

## 2022-08-15 NOTE — TELEPHONE ENCOUNTER
Spoke with Lenard Cook. Appt ok'd for 8/19 at 7:30. Appt scheduled. Call to pt, LM for return call-needs notified of appt.

## 2022-08-19 ENCOUNTER — OFFICE VISIT (OUTPATIENT)
Dept: CARDIOLOGY CLINIC | Age: 47
End: 2022-08-19
Payer: COMMERCIAL

## 2022-08-19 ENCOUNTER — HOSPITAL ENCOUNTER (OUTPATIENT)
Age: 47
Discharge: HOME | End: 2022-08-19
Payer: COMMERCIAL

## 2022-08-19 VITALS
SYSTOLIC BLOOD PRESSURE: 130 MMHG | WEIGHT: 281 LBS | HEART RATE: 74 BPM | HEIGHT: 72 IN | BODY MASS INDEX: 38.06 KG/M2 | DIASTOLIC BLOOD PRESSURE: 80 MMHG

## 2022-08-19 DIAGNOSIS — Z01.818 PRE-OP TESTING: ICD-10-CM

## 2022-08-19 DIAGNOSIS — R06.02 SOB (SHORTNESS OF BREATH): Primary | ICD-10-CM

## 2022-08-19 DIAGNOSIS — R35.0 URINARY FREQUENCY: ICD-10-CM

## 2022-08-19 DIAGNOSIS — Z01.818 PRE-OP EVALUATION: ICD-10-CM

## 2022-08-19 DIAGNOSIS — N48.1 BALANITIS: ICD-10-CM

## 2022-08-19 LAB
ANION GAP SERPL CALCULATED.3IONS-SCNC: 9 MEQ/L (ref 8–16)
BASOPHILS # BLD: 1 %
BASOPHILS ABSOLUTE: 0.1 THOU/MM3 (ref 0–0.1)
BUN BLDV-MCNC: 10 MG/DL (ref 7–22)
CALCIUM SERPL-MCNC: 9.3 MG/DL (ref 8.5–10.5)
CHLORIDE BLD-SCNC: 105 MEQ/L (ref 98–111)
CO2: 28 MEQ/L (ref 23–33)
CREAT SERPL-MCNC: 0.9 MG/DL (ref 0.4–1.2)
EOSINOPHIL # BLD: 2.4 %
EOSINOPHILS ABSOLUTE: 0.2 THOU/MM3 (ref 0–0.4)
ERYTHROCYTE [DISTWIDTH] IN BLOOD BY AUTOMATED COUNT: 12.8 % (ref 11.5–14.5)
ERYTHROCYTE [DISTWIDTH] IN BLOOD BY AUTOMATED COUNT: 41.3 FL (ref 35–45)
GFR SERPL CREATININE-BSD FRML MDRD: > 90 ML/MIN/1.73M2
GLUCOSE BLD-MCNC: 101 MG/DL (ref 70–108)
HCT VFR BLD CALC: 49.8 % (ref 42–52)
HEMOGLOBIN: 16.8 GM/DL (ref 14–18)
IMMATURE GRANS (ABS): 0.02 THOU/MM3 (ref 0–0.07)
IMMATURE GRANULOCYTES: 0.2 %
LYMPHOCYTES # BLD: 28.8 %
LYMPHOCYTES ABSOLUTE: 2.6 THOU/MM3 (ref 1–4.8)
MCH RBC QN AUTO: 29.8 PG (ref 26–33)
MCHC RBC AUTO-ENTMCNC: 33.7 GM/DL (ref 32.2–35.5)
MCV RBC AUTO: 88.5 FL (ref 80–94)
MONOCYTES # BLD: 8.5 %
MONOCYTES ABSOLUTE: 0.8 THOU/MM3 (ref 0.4–1.3)
NUCLEATED RED BLOOD CELLS: 0 /100 WBC
PLATELET # BLD: 274 THOU/MM3 (ref 130–400)
PMV BLD AUTO: 9 FL (ref 9.4–12.4)
POTASSIUM SERPL-SCNC: 4.6 MEQ/L (ref 3.5–5.2)
RBC # BLD: 5.63 MILL/MM3 (ref 4.7–6.1)
SEG NEUTROPHILS: 59.1 %
SEGMENTED NEUTROPHILS ABSOLUTE COUNT: 5.3 THOU/MM3 (ref 1.8–7.7)
SODIUM BLD-SCNC: 142 MEQ/L (ref 135–145)
WBC # BLD: 8.9 THOU/MM3 (ref 4.8–10.8)

## 2022-08-19 PROCEDURE — 93000 ELECTROCARDIOGRAM COMPLETE: CPT | Performed by: NUCLEAR MEDICINE

## 2022-08-19 PROCEDURE — 85025 COMPLETE CBC W/AUTO DIFF WBC: CPT

## 2022-08-19 PROCEDURE — 80048 BASIC METABOLIC PNL TOTAL CA: CPT

## 2022-08-19 PROCEDURE — 99213 OFFICE O/P EST LOW 20 MIN: CPT | Performed by: NUCLEAR MEDICINE

## 2022-08-19 PROCEDURE — 87086 URINE CULTURE/COLONY COUNT: CPT

## 2022-08-19 NOTE — PROGRESS NOTES
Pt here for pre op clearance for pre op clearance for circumcision with Dr Lilian Chacon 9/2/22.     EKG DONE

## 2022-08-19 NOTE — PROGRESS NOTES
[address] [facility] .  [address]  [facility] [address]  Dept: [phone]  Dept Fax: [phone]  Loc: [phone]    Visit Date: 2022    Niels Wheat is a 56 y.o. male who presents todayfor:  Chief Complaint   Patient presents with    Pre-op Exam   Not seen since   Was mainly PVcs back then   Did well   No more palpitation  Going for circumcision  Here for clearance  No chest pain   No changes in breathing  Active   No symptoms at all  BP is stable         HPI:  HPI  Past Medical History:   Diagnosis Date    Adenomatous colon polyp 2022    COVID-19 virus infection 2020    Obesity, Class II, BMI 35-39.9, no comorbidity     Tobacco dependence in remission       Past Surgical History:   Procedure Laterality Date    ARM SURGERY  2016    [facility] doctor - possibly Dr Crow Monge  ,     L 4-5    VASECTOMY       Family History   Problem Relation Age of Onset    Heart Disease Father      Social History     Tobacco Use    Smoking status: Former     Types: Cigarettes     Quit date: 2011     Years since quittin.2    Smokeless tobacco: Never   Substance Use Topics    Alcohol use: No      Current Outpatient Medications   Medication Sig Dispense Refill    nystatin-triamcinolone (MYCOLOG II) 464996-7.1 UNIT/GM-% cream Apply up to 4 times a day to penile foreskin as needed 1 each 3    Ascorbic Acid (VITAMIN C) 500 MG tablet Take 500 mg by mouth daily      Multiple Vitamins-Minerals (THERAPEUTIC MULTIVITAMIN-MINERALS) tablet Take 1 tablet by mouth daily. No current facility-administered medications for this visit.      No Known Allergies  Health Maintenance   Topic Date Due    DTaP/Tdap/Td vaccine (1 - Tdap) Never done    Diabetes screen  Never done    COVID-19 Vaccine (3 - Booster for Moderna series) 2022    Depression Screen  2022    Hepatitis C screen  2023 (Originally 1994)    HIV screen 03/04/2023 (Originally 1/18/1991)    Flu vaccine (1) 09/01/2022    Lipids  01/06/2025    Colorectal Cancer Screen  05/27/2032    Hepatitis A vaccine  Aged Out    Hepatitis B vaccine  Aged Out    Hib vaccine  Aged Out    Meningococcal (ACWY) vaccine  Aged Out    Pneumococcal 0-64 years Vaccine  Aged Out       Subjective:  Review of Systems  General:   No fever, no chills, No fatigue or weight loss  Pulmonary:    some dyspnea, no wheezing  Cardiac:    Denies recent chest pain,   GI:     No nausea or vomiting, no abdominal pain  Neuro:    No dizziness or light headedness,   Musculoskeletal:  No recent active issues  Extremities:   No edema, no obvious claudication     Objective:  Physical Exam  /80   Pulse 74   Ht 6' (1.829 m)   Wt 281 lb (127.5 kg)   BMI 38.11 kg/m²   General:   Well developed, well nourished  Lungs:   Clear to auscultation  Heart:    Normal S1 S2, Slight murmur. no rubs, no gallops  Abdomen:   Soft, non tender, no organomegalies, positive bowel sounds  Extremities:   No edema, no cyanosis, good peripheral pulses  Neurological:   Awake, alert, oriented. No obvious focal deficits  Musculoskelatal:  No obvious deformities    Assessment:      Diagnosis Orders   1. SOB (shortness of breath)  EKG 12 Lead    EKG 12 Lead      2. Pre-op evaluation        As above  Cardiac stable for now  ECG in office was done today. I reviewed the ECG. No acute findings    Plan:  No follow-ups on file. As above  Clear for surgery   Continue risk factor modification and medical management  Thank you for allowing me to participate in the care of your patient. Please don't hesitate to contact me regarding any further issues related to the patient care    Orders Placed:  Orders Placed This Encounter   Procedures    EKG 12 Lead     Order Specific Question:   Reason for Exam?     Answer: Other    EKG 12 Lead     Order Specific Question:   Reason for Exam?     Answer:    Other     Comments:   sob       Medications Prescribed:  No orders of the defined types were placed in this encounter. Discussed use, benefit, and side effects of prescribed medications. All patient questions answered. Pt voicedunderstanding. Instructed to continue current medications, diet and exercise. Continue risk factor modification and medical management. Patient agreed with treatment plan. Follow up as directed.     Electronically signedby Roe St MD on 8/19/2022 at 7:40 AM

## 2022-08-21 LAB — URINE CULTURE, ROUTINE: NORMAL

## 2022-08-25 ENCOUNTER — PREP FOR PROCEDURE (OUTPATIENT)
Dept: UROLOGY | Age: 47
End: 2022-08-25

## 2022-08-25 RX ORDER — SODIUM CHLORIDE 9 MG/ML
INJECTION, SOLUTION INTRAVENOUS CONTINUOUS
Status: CANCELLED | OUTPATIENT
Start: 2022-09-02

## 2022-08-25 NOTE — PROGRESS NOTES
Follow all instructions given by your physician    NPO after midnight   Sips of water am of surgery with allowed medications  Bring insurance info and 's license  Wear comfortable clean, loose fitting clothing  No jewelry or contact lenses to be worn day of surgery  No glue on dentures morning of surgery;you will be asked to remove them for surgery. Case for glasses. Shower night before and morning of surgery with a liquid antibacterial soap, dry with fresh clean towel; no lotions, creams or powder. Clean sheets and pillow case on bed night before surgery  Bring medications in original bottles  Bring CPAP/BIPAP machine if you have one ( you may be charged if one is needed in recovery room )   needed at discharge and someone over 18 to stay with you for 24 hours overnight (surgery may be cancelled if you don't have this)  Report to [facility] on 2nd floor  If you would become ill prior to surgery, please call the surgeon  May have a visitor with you, we request that you limit to 2 visitors in pre-op area  Please bring and wear mask  Call [phone] for any questions  Covid questionnaire Complete; Patient negative for symptoms or exposure. See documentation.

## 2022-08-25 NOTE — PROGRESS NOTES
PAT call attempted, patient unavailable, left message to please call us back at your earliest convenience; [phone]

## 2022-09-01 NOTE — PROGRESS NOTES
Pharmacy Pre-Op Antibiotic Dose Adjustment    Jamir Gil is a 56 y.o. male scheduled for surgery. Pharmacy will adjust the following pre-op antibiotic per P&T approved policy: cefazolin    Weight:  Wt Readings from Last 1 Encounters:   08/19/22 281 lb (127.5 kg)     Body mass index is 37.97 kg/m².     Plan:   Pre-op antibiotic adjustment: increase cefazolin from 2 g to 3 g    Johnny Romo PharmD, BCPS   9/1/2022  2:33 PM

## 2022-09-02 ENCOUNTER — HOSPITAL ENCOUNTER (OUTPATIENT)
Age: 47
Setting detail: OUTPATIENT SURGERY
Discharge: HOME | End: 2022-09-02
Attending: UROLOGY | Admitting: UROLOGY
Payer: COMMERCIAL

## 2022-09-02 ENCOUNTER — ANESTHESIA (OUTPATIENT)
Dept: OPERATING ROOM | Age: 47
End: 2022-09-02
Payer: COMMERCIAL

## 2022-09-02 ENCOUNTER — ANESTHESIA EVENT (OUTPATIENT)
Dept: OPERATING ROOM | Age: 47
End: 2022-09-02
Payer: COMMERCIAL

## 2022-09-02 VITALS
DIASTOLIC BLOOD PRESSURE: 72 MMHG | WEIGHT: 278 LBS | OXYGEN SATURATION: 96 % | HEIGHT: 72 IN | HEART RATE: 63 BPM | BODY MASS INDEX: 37.65 KG/M2 | RESPIRATION RATE: 16 BRPM | TEMPERATURE: 97.2 F | SYSTOLIC BLOOD PRESSURE: 132 MMHG

## 2022-09-02 DIAGNOSIS — G89.18 POST-OPERATIVE PAIN: Primary | ICD-10-CM

## 2022-09-02 PROCEDURE — 3700000000 HC ANESTHESIA ATTENDED CARE: Performed by: UROLOGY

## 2022-09-02 PROCEDURE — 7100000011 HC PHASE II RECOVERY - ADDTL 15 MIN: Performed by: UROLOGY

## 2022-09-02 PROCEDURE — 2580000003 HC RX 258: Performed by: NURSE ANESTHETIST, CERTIFIED REGISTERED

## 2022-09-02 PROCEDURE — 6360000002 HC RX W HCPCS: Performed by: NURSE ANESTHETIST, CERTIFIED REGISTERED

## 2022-09-02 PROCEDURE — 3600000012 HC SURGERY LEVEL 2 ADDTL 15MIN: Performed by: UROLOGY

## 2022-09-02 PROCEDURE — 7100000010 HC PHASE II RECOVERY - FIRST 15 MIN: Performed by: UROLOGY

## 2022-09-02 PROCEDURE — 2709999900 HC NON-CHARGEABLE SUPPLY: Performed by: UROLOGY

## 2022-09-02 PROCEDURE — 2580000003 HC RX 258: Performed by: UROLOGY

## 2022-09-02 PROCEDURE — 7100000000 HC PACU RECOVERY - FIRST 15 MIN: Performed by: UROLOGY

## 2022-09-02 PROCEDURE — 3600000002 HC SURGERY LEVEL 2 BASE: Performed by: UROLOGY

## 2022-09-02 PROCEDURE — 2500000003 HC RX 250 WO HCPCS: Performed by: UROLOGY

## 2022-09-02 PROCEDURE — 2500000003 HC RX 250 WO HCPCS: Performed by: NURSE ANESTHETIST, CERTIFIED REGISTERED

## 2022-09-02 PROCEDURE — 3700000001 HC ADD 15 MINUTES (ANESTHESIA): Performed by: UROLOGY

## 2022-09-02 PROCEDURE — 7100000001 HC PACU RECOVERY - ADDTL 15 MIN: Performed by: UROLOGY

## 2022-09-02 RX ORDER — SODIUM CHLORIDE 0.9 % (FLUSH) 0.9 %
5-40 SYRINGE (ML) INJECTION EVERY 12 HOURS SCHEDULED
Status: DISCONTINUED | OUTPATIENT
Start: 2022-09-02 | End: 2022-09-02 | Stop reason: HOSPADM

## 2022-09-02 RX ORDER — PROPOFOL 10 MG/ML
INJECTION, EMULSION INTRAVENOUS PRN
Status: DISCONTINUED | OUTPATIENT
Start: 2022-09-02 | End: 2022-09-02 | Stop reason: SDUPTHER

## 2022-09-02 RX ORDER — MIDAZOLAM HYDROCHLORIDE 1 MG/ML
INJECTION INTRAMUSCULAR; INTRAVENOUS PRN
Status: DISCONTINUED | OUTPATIENT
Start: 2022-09-02 | End: 2022-09-02 | Stop reason: SDUPTHER

## 2022-09-02 RX ORDER — ONDANSETRON 2 MG/ML
INJECTION INTRAMUSCULAR; INTRAVENOUS PRN
Status: DISCONTINUED | OUTPATIENT
Start: 2022-09-02 | End: 2022-09-02 | Stop reason: SDUPTHER

## 2022-09-02 RX ORDER — DIPHENHYDRAMINE HYDROCHLORIDE 50 MG/ML
12.5 INJECTION INTRAMUSCULAR; INTRAVENOUS
Status: DISCONTINUED | OUTPATIENT
Start: 2022-09-02 | End: 2022-09-02 | Stop reason: HOSPADM

## 2022-09-02 RX ORDER — HYDROCODONE BITARTRATE AND ACETAMINOPHEN 5; 325 MG/1; MG/1
1 TABLET ORAL EVERY 6 HOURS PRN
Qty: 20 TABLET | Refills: 0 | Status: SHIPPED | OUTPATIENT
Start: 2022-09-02 | End: 2022-09-07

## 2022-09-02 RX ORDER — DEXAMETHASONE SODIUM PHOSPHATE 10 MG/ML
INJECTION, EMULSION INTRAMUSCULAR; INTRAVENOUS PRN
Status: DISCONTINUED | OUTPATIENT
Start: 2022-09-02 | End: 2022-09-02 | Stop reason: SDUPTHER

## 2022-09-02 RX ORDER — FENTANYL CITRATE 50 UG/ML
50 INJECTION, SOLUTION INTRAMUSCULAR; INTRAVENOUS EVERY 5 MIN PRN
Status: DISCONTINUED | OUTPATIENT
Start: 2022-09-02 | End: 2022-09-02 | Stop reason: HOSPADM

## 2022-09-02 RX ORDER — METOCLOPRAMIDE HYDROCHLORIDE 5 MG/ML
10 INJECTION INTRAMUSCULAR; INTRAVENOUS
Status: DISCONTINUED | OUTPATIENT
Start: 2022-09-02 | End: 2022-09-02 | Stop reason: HOSPADM

## 2022-09-02 RX ORDER — LIDOCAINE HYDROCHLORIDE 20 MG/ML
INJECTION, SOLUTION EPIDURAL; INFILTRATION; INTRACAUDAL; PERINEURAL PRN
Status: DISCONTINUED | OUTPATIENT
Start: 2022-09-02 | End: 2022-09-02 | Stop reason: SDUPTHER

## 2022-09-02 RX ORDER — SODIUM CHLORIDE 0.9 % (FLUSH) 0.9 %
5-40 SYRINGE (ML) INJECTION PRN
Status: DISCONTINUED | OUTPATIENT
Start: 2022-09-02 | End: 2022-09-02 | Stop reason: HOSPADM

## 2022-09-02 RX ORDER — MEPERIDINE HYDROCHLORIDE 25 MG/ML
12.5 INJECTION INTRAMUSCULAR; INTRAVENOUS; SUBCUTANEOUS ONCE
Status: DISCONTINUED | OUTPATIENT
Start: 2022-09-02 | End: 2022-09-02 | Stop reason: HOSPADM

## 2022-09-02 RX ORDER — BUPIVACAINE HYDROCHLORIDE 2.5 MG/ML
INJECTION, SOLUTION INFILTRATION; PERINEURAL PRN
Status: DISCONTINUED | OUTPATIENT
Start: 2022-09-02 | End: 2022-09-02 | Stop reason: ALTCHOICE

## 2022-09-02 RX ORDER — SODIUM CHLORIDE 9 MG/ML
INJECTION, SOLUTION INTRAVENOUS PRN
Status: DISCONTINUED | OUTPATIENT
Start: 2022-09-02 | End: 2022-09-02 | Stop reason: HOSPADM

## 2022-09-02 RX ORDER — SODIUM CHLORIDE 9 MG/ML
INJECTION, SOLUTION INTRAVENOUS CONTINUOUS PRN
Status: DISCONTINUED | OUTPATIENT
Start: 2022-09-02 | End: 2022-09-02 | Stop reason: SDUPTHER

## 2022-09-02 RX ORDER — FENTANYL CITRATE 50 UG/ML
25 INJECTION, SOLUTION INTRAMUSCULAR; INTRAVENOUS EVERY 5 MIN PRN
Status: DISCONTINUED | OUTPATIENT
Start: 2022-09-02 | End: 2022-09-02 | Stop reason: HOSPADM

## 2022-09-02 RX ORDER — CEPHALEXIN 500 MG/1
500 CAPSULE ORAL 3 TIMES DAILY
Qty: 21 CAPSULE | Refills: 0 | Status: SHIPPED | OUTPATIENT
Start: 2022-09-02 | End: 2022-09-09

## 2022-09-02 RX ORDER — FENTANYL CITRATE 50 UG/ML
INJECTION, SOLUTION INTRAMUSCULAR; INTRAVENOUS PRN
Status: DISCONTINUED | OUTPATIENT
Start: 2022-09-02 | End: 2022-09-02 | Stop reason: SDUPTHER

## 2022-09-02 RX ORDER — SODIUM CHLORIDE 9 MG/ML
INJECTION, SOLUTION INTRAVENOUS CONTINUOUS
Status: DISCONTINUED | OUTPATIENT
Start: 2022-09-02 | End: 2022-09-02 | Stop reason: HOSPADM

## 2022-09-02 RX ADMIN — FENTANYL CITRATE 50 MCG: 50 INJECTION, SOLUTION INTRAMUSCULAR; INTRAVENOUS at 08:06

## 2022-09-02 RX ADMIN — CEFAZOLIN 3000 MG: 10 INJECTION, POWDER, FOR SOLUTION INTRAVENOUS at 07:56

## 2022-09-02 RX ADMIN — LIDOCAINE HYDROCHLORIDE 100 MG: 20 INJECTION, SOLUTION EPIDURAL; INFILTRATION; INTRACAUDAL; PERINEURAL at 07:52

## 2022-09-02 RX ADMIN — FENTANYL CITRATE 50 MCG: 50 INJECTION, SOLUTION INTRAMUSCULAR; INTRAVENOUS at 07:56

## 2022-09-02 RX ADMIN — MIDAZOLAM 2 MG: 1 INJECTION INTRAMUSCULAR; INTRAVENOUS at 07:44

## 2022-09-02 RX ADMIN — DEXAMETHASONE SODIUM PHOSPHATE 8 MG: 10 INJECTION, EMULSION INTRAMUSCULAR; INTRAVENOUS at 07:59

## 2022-09-02 RX ADMIN — SODIUM CHLORIDE: 9 INJECTION, SOLUTION INTRAVENOUS at 07:43

## 2022-09-02 RX ADMIN — ONDANSETRON 4 MG: 2 INJECTION INTRAMUSCULAR; INTRAVENOUS at 08:21

## 2022-09-02 RX ADMIN — PROPOFOL 200 MG: 10 INJECTION, EMULSION INTRAVENOUS at 07:52

## 2022-09-02 RX ADMIN — FENTANYL CITRATE 50 MCG: 50 INJECTION, SOLUTION INTRAMUSCULAR; INTRAVENOUS at 07:45

## 2022-09-02 RX ADMIN — SODIUM CHLORIDE: 9 INJECTION, SOLUTION INTRAVENOUS at 06:58

## 2022-09-02 ASSESSMENT — PAIN - FUNCTIONAL ASSESSMENT: PAIN_FUNCTIONAL_ASSESSMENT: NONE - DENIES PAIN

## 2022-09-02 ASSESSMENT — PAIN SCALES - GENERAL
PAINLEVEL_OUTOF10: 0
PAINLEVEL_OUTOF10: 2
PAINLEVEL_OUTOF10: 0

## 2022-09-02 NOTE — H&P
Cecilia Louie MD  History and Physical    Patient:  Annamaria Morrow  MRN: [id number]  YOB: 1975    HISTORY OF PRESENT ILLNESS:     The patient is a 56 y.o. male who presents with phimosis. Here for procedure. Patient's old records, notes and chart reviewed and summarized above. Cecilia Louie MD independently reviewed the images and verified the radiology reports from:    No results found. Past Medical History:    Past Medical History:   Diagnosis Date    Adenomatous colon polyp 2022    COVID-19 virus infection 2020    Obesity, Class II, BMI 35-39.9, no comorbidity     Tobacco dependence in remission        Past Surgical History:    Past Surgical History:   Procedure Laterality Date    ARM SURGERY  2016    [facility] doctor - possibly Dr Gary Martinez  ,     L 4-5    VASECTOMY         Medications Prior to Admission:    Prior to Admission medications    Medication Sig Start Date End Date Taking? Authorizing Provider   nystatin-triamcinolone [facility]) 102099-1.5 UNIT/GM-% cream Apply up to 4 times a day to penile foreskin as needed 22   Vickie Ayala MD   Ascorbic Acid (VITAMIN C) 500 MG tablet Take 500 mg by mouth daily    Historical Provider, MD   Multiple Vitamins-Minerals (THERAPEUTIC MULTIVITAMIN-MINERALS) tablet Take 1 tablet by mouth daily. Historical Provider, MD       Allergies:  Patient has no known allergies.     Social History:    Social History     Socioeconomic History    Marital status:      Spouse name: Not on file    Number of children: Not on file    Years of education: Not on file    Highest education level: Not on file   Occupational History    Not on file   Tobacco Use    Smoking status: Former     Types: Cigarettes     Quit date: 2011     Years since quittin.2    Smokeless tobacco: Never   Vaping Use    Vaping Use: Former   Substance and Sexual Activity    Alcohol use: No    Drug use: No    Sexual activity: Yes     Partners: Female   Other Topics Concern    Not on file   Social History Narrative    Not on file     Social Determinants of Health     Financial Resource Strain: Not on file   Food Insecurity: Not on file   Transportation Needs: Not on file   Physical Activity: Not on file   Stress: Not on file   Social Connections: Not on file   Intimate Partner Violence: Not on file   Housing Stability: Not on file       Family History:    Family History   Problem Relation Age of Onset    Heart Disease Father        REVIEW OF SYSTEMS:  Constitutional: negative  Eyes: negative  Respiratory: negative  Cardiovascular: negative  Gastrointestinal: negative  Genitourinary: no acute issues  Musculoskeletal: negative  Skin: negative   Neurological: negative  Hematological/Lymphatic: negative  Psychological: negative    Physical Exam:      Patient Vitals for the past 24 hrs:   BP Temp Temp src Pulse Resp SpO2 Height Weight   09/02/22 0644 119/70 98.3 °F (36.8 °C) Temporal 60 16 94 % 6' (1.829 m) 278 lb (126.1 kg)     Constitutional: Patient in no acute distress; Neuro: alert and oriented to person place and time. Psych: Mood and affect normal.  Skin: Normal  Lungs: Respiratory effort normal, CTA  Cardiovascular:  Normal peripheral pulses; no murmur. Normal rhythm  Abdomen: Soft, non-tender, non-distended with no CVA, flank pain, hepatosplenomegaly or hernia. Kidneys normal.  Bladder non-tender and not distended. LABS:   No results for input(s): WBC, HGB, HCT, MCV, PLT in the last 72 hours. No results for input(s): NA, K, CL, CO2, PHOS, BUN, CREATININE, CA in the last 72 hours. No results found for: PSA      Urinalysis: No results for input(s): COLORU, PHUR, LABCAST, WBCUA, RBCUA, MUCUS, TRICHOMONAS, YEAST, BACTERIA, CLARITYU, SPECGRAV, LEUKOCYTESUR, UROBILINOGEN, Danton Jesse in the last 72 hours.     Invalid input(s): Lia Francois -----------------------------------------------------------------      Assessment and Plan     Impression:    Patient Active Problem List   Diagnosis    Tobacco dependence in remission    Obesity, Class II, BMI 35-39.9, no comorbidity    Adenomatous colon polyp       Plan:     Consent obtained; circ in OR today    Suzan Dominguez MD  7:23 AM 9/2/2022

## 2022-09-02 NOTE — PROGRESS NOTES
3128- pt to pacu, oral airway present, resp easy, VSS, pt appears in no acute distress  0905- pt remains stable, oral airway present, pt appears in no acute distress  0915- oral airway removed, pt alert and oriented x4, pt denies pain, resp easy and unlabored, VSS, pt appears in no acute distress  0925- pt resting in bed with eyes closed, resp easy and unlabored, VSS, pt denies pain, pt appears in no acute distress  0930- pt meets criteria for discharge from pacu, pt transported to [facility], report given to [facility]

## 2022-09-02 NOTE — ANESTHESIA POSTPROCEDURE EVALUATION
Department of Anesthesiology  Postprocedure Note    Patient: Melvin Gaviria  MRN: [id number]  YOB: 1975  Date of evaluation: 9/2/2022      Procedure Summary     Date: 09/02/22 Room / Location: [address] [place]FF [place]    Anesthesia Start: 4311 Anesthesia Stop: 0902    Procedure: Circumcision (Penis) Diagnosis:       Balanitis      (Balanitis [N48.1])    Surgeons: Flex Schmidt MD Responsible Provider: Ines Foote MD    Anesthesia Type: General ASA Status: 2          Anesthesia Type: General    Judy Phase I: Judy Score: 5    Judy Phase II: Judy Score: 10      Anesthesia Post Evaluation    Patient location during evaluation: PACU  Patient participation: complete - patient participated  Level of consciousness: awake and alert  Airway patency: patent  Nausea & Vomiting: no nausea and no vomiting  Complications: no  Cardiovascular status: hemodynamically stable  Respiratory status: acceptable  Hydration status: euvolemic

## 2022-09-02 NOTE — ANESTHESIA PRE PROCEDURE
Department of Anesthesiology  Preprocedure Note       Name:  Ehsan Cagle   Age:  56 y.o.  :  1975                                          MRN:  [id number]         Date:  2022      Surgeon: Emili Love):  Mikaela Abel MD    Procedure: Procedure(s):  Circumcision    Medications prior to admission:   Prior to Admission medications    Medication Sig Start Date End Date Taking? Authorizing Provider   nystatin-triamcinolone [facility]) 620838-0.9 UNIT/GM-% cream Apply up to 4 times a day to penile foreskin as needed 22   Mikaela Abel MD   Ascorbic Acid (VITAMIN C) 500 MG tablet Take 500 mg by mouth daily    Historical Provider, MD   Multiple Vitamins-Minerals (THERAPEUTIC MULTIVITAMIN-MINERALS) tablet Take 1 tablet by mouth daily.     Historical Provider, MD       Current medications:    Current Facility-Administered Medications   Medication Dose Route Frequency Provider Last Rate Last Admin    ceFAZolin (ANCEF) 3000 mg in dextrose 5 % 100 mL IVPB  3,000 mg IntraVENous 30 Min Pre-Op Mikaela Abel MD        0.9 % sodium chloride infusion   IntraVENous Continuous Mikaela Abel  mL/hr at 22 0658 New Bag at 22 1665       Allergies:  No Known Allergies    Problem List:    Patient Active Problem List   Diagnosis Code    Tobacco dependence in remission F17.201    Obesity, Class II, BMI 35-39.9, no comorbidity E66.9    Adenomatous colon polyp D12.6       Past Medical History:        Diagnosis Date    Adenomatous colon polyp 2022    COVID-19 virus infection 2020    Obesity, Class II, BMI 35-39.9, no comorbidity     Tobacco dependence in remission        Past Surgical History:        Procedure Laterality Date    ARM SURGERY  2016    [facility] doctor - possibly Dr Jarvis Courser  ,     L 4-5    VASECTOMY         Social History:    Social History     Tobacco Use    Smoking status: Former     Types: Cigarettes     Quit date: 2011     Years since quittin.2    AM    INR 1.08 08/29/2020 08:12 AM    APTT 38.0 08/29/2020 08:12 AM       HCG (If Applicable): No results found for: PREGTESTUR, PREGSERUM, HCG, HCGQUANT     ABGs: No results found for: PHART, PO2ART, EHA8AWW, EEL5CTQ, BEART, C8RYECUU     Type & Screen (If Applicable):  No results found for: LABABO, LABRH    Drug/Infectious Status (If Applicable):  No results found for: HIV, HEPCAB    COVID-19 Screening (If Applicable): No results found for: COVID19        Anesthesia Evaluation  Patient summary reviewed no history of anesthetic complications:   Airway: Mallampati: II  TM distance: >3 FB   Neck ROM: full  Mouth opening: > = 3 FB   Dental: normal exam         Pulmonary:normal exam                              ROS comment: Former smoker   Cardiovascular:                      Neuro/Psych:               GI/Hepatic/Renal:             Endo/Other:                     Abdominal:   (+) obese,           Vascular: Other Findings:           Anesthesia Plan      general     ASA 2       Induction: intravenous. MIPS: Postoperative opioids intended and Prophylactic antiemetics administered. Anesthetic plan and risks discussed with patient.       Plan discussed with CRNA and surgical team.                    Bree Vanessa MD   9/2/2022

## 2022-09-02 NOTE — DISCHARGE INSTRUCTIONS
Finish antibiotics  [place] for pain    Pt ok to discharge home in good condition  No heavy lifting, >10 lbs for today  Pt should avoid strenuous activity for today  Pt should walk moderately at home  Pt ok to shower   Pt may resume diet as tolerated  Pt should take Rx as directed  No driving while on narcotics  Please call attending physician or hospital  with questions  Call or Present to ED if fever (> 101F), intractable nausea vomiting or pain.   Rx in chart     Pt should follow up with Dr Andree Mireles in 1 month, call to confirm appointment

## 2022-09-02 NOTE — BRIEF OP NOTE
Brief Postoperative Note      Patient: David Yuan  YOB: 1975  MRN: [id number]    Date of Procedure: 9/2/2022    Pre-Op Diagnosis: Balanitis [N48.1]    Post-Op Diagnosis: Same       Procedure(s):  Circumcision    Surgeon(s):  Khushi Bernardo MD    Assistant:  * No surgical staff found *    Anesthesia: General    Estimated Blood Loss (mL): Minimal    Complications: None    Specimens:   * No specimens in log *    Implants:  * No implants in log *      Drains: * No LDAs found *    Findings:  f/u one month circ check martinez.  Abx seven days, pain meds    Electronically signed by Cyrus Cook MD on 9/2/2022 at 9:16 AM

## 2022-09-10 DIAGNOSIS — G89.18 POSTOPERATIVE PAIN: Primary | ICD-10-CM

## 2022-09-10 RX ORDER — TRAMADOL HYDROCHLORIDE 50 MG/1
50 TABLET ORAL EVERY 6 HOURS PRN
Qty: 12 TABLET | Refills: 0 | Status: SHIPPED | OUTPATIENT
Start: 2022-09-10 | End: 2022-09-13

## 2022-09-11 NOTE — OP NOTE
[address] [place] [address]. [place]    DATE: 9/2/2022  Patient:  Max Chase  MRN: [id number]  YOB: 1975    SURGEON: Iam Tapia MD.    ASSISTANT: none    PREOPERATIVE DIAGNOSIS: Phimosis    POSTOPERATIVE DIAGNOSIS: Phimosis    PROCEDURE PERFORMED: Circumcision, dorsal penile nerve block for postoperative pain    ANESTHESIA: General    COMPLICATIONS: none    OR BLOOD LOSS:  Minimal    FLUIDS: Cystalloids per Anesthesia    SPECIMENS:  * No specimens in log *  none    DRAINS: none    INDICATIONS FOR PROCEDURE:  The patient is a 56 y.o. male who presents today with Balanitis [N48.1] here for Circumcision. After risks, benefits and alternatives of the procedure were discussed with the patient, the patient elected to proceed. PROCEDURE:  The patient was brought back to the operating room, was laid in the supine position. Antibiotics were administered. General anesthesia was induced. He was then scrubbed and prepped in the usual sterile fashion. A proper time out was performed. .  After retracting the foreskin, we then made a circumferential catie around the distal shaft, approximately 4 mm from the coronal sulcus. A 15 blade scalpel was used to make an incision through the epidermis. We then pulled the foreskin back to normal position, and marked out the proximal incision. We used a 15 blade scalpel again to make an incision through the epidermis. We then used bovie electrocautery to amputate the foreskin. Meticulous hemostasis was achieved. We then re-approximated the penile shaft skin with 3-0 chromic interrupted sutures. Hemostasis was persistent. Bacitracin ointment was placed around the incision. .  dorsal penile nerve block for postop pain was performed. Heaven Miller His anesthesia was reversed and he was taken to recovery in stable condition. I was present for all critical portions of the case.

## 2022-09-21 ENCOUNTER — TELEPHONE (OUTPATIENT)
Dept: UROLOGY | Age: 47
End: 2022-09-21

## 2022-09-21 ENCOUNTER — OFFICE VISIT (OUTPATIENT)
Dept: FAMILY MEDICINE CLINIC | Age: 47
End: 2022-09-21

## 2022-09-21 VITALS
BODY MASS INDEX: 38.13 KG/M2 | WEIGHT: 281.5 LBS | HEIGHT: 72 IN | RESPIRATION RATE: 18 BRPM | HEART RATE: 64 BPM | DIASTOLIC BLOOD PRESSURE: 76 MMHG | SYSTOLIC BLOOD PRESSURE: 122 MMHG

## 2022-09-21 DIAGNOSIS — T81.49XA POSTOPERATIVE WOUND INFECTION: Primary | ICD-10-CM

## 2022-09-21 PROCEDURE — 99213 OFFICE O/P EST LOW 20 MIN: CPT | Performed by: FAMILY MEDICINE

## 2022-09-21 RX ORDER — CEPHALEXIN 500 MG/1
500 CAPSULE ORAL 3 TIMES DAILY
Qty: 21 CAPSULE | Refills: 0 | Status: SHIPPED | OUTPATIENT
Start: 2022-09-21

## 2022-09-21 RX ORDER — SULFAMETHOXAZOLE AND TRIMETHOPRIM 800; 160 MG/1; MG/1
1 TABLET ORAL 2 TIMES DAILY
Qty: 14 TABLET | Refills: 0 | Status: SHIPPED | OUTPATIENT
Start: 2022-09-21 | End: 2022-09-28

## 2022-09-21 SDOH — ECONOMIC STABILITY: FOOD INSECURITY: WITHIN THE PAST 12 MONTHS, THE FOOD YOU BOUGHT JUST DIDN'T LAST AND YOU DIDN'T HAVE MONEY TO GET MORE.: NEVER TRUE

## 2022-09-21 SDOH — ECONOMIC STABILITY: FOOD INSECURITY: WITHIN THE PAST 12 MONTHS, YOU WORRIED THAT YOUR FOOD WOULD RUN OUT BEFORE YOU GOT MONEY TO BUY MORE.: NEVER TRUE

## 2022-09-21 ASSESSMENT — ENCOUNTER SYMPTOMS
EYE PAIN: 0
BACK PAIN: 0
COUGH: 0
SORE THROAT: 0
BLOOD IN STOOL: 0
NAUSEA: 0
ABDOMINAL PAIN: 0
SHORTNESS OF BREATH: 0
TROUBLE SWALLOWING: 0
CHEST TIGHTNESS: 0
CONSTIPATION: 0

## 2022-09-21 ASSESSMENT — PATIENT HEALTH QUESTIONNAIRE - PHQ9
1. LITTLE INTEREST OR PLEASURE IN DOING THINGS: 0
SUM OF ALL RESPONSES TO PHQ QUESTIONS 1-9: 0
SUM OF ALL RESPONSES TO PHQ9 QUESTIONS 1 & 2: 0
SUM OF ALL RESPONSES TO PHQ QUESTIONS 1-9: 0
2. FEELING DOWN, DEPRESSED OR HOPELESS: 0

## 2022-09-21 ASSESSMENT — SOCIAL DETERMINANTS OF HEALTH (SDOH): HOW HARD IS IT FOR YOU TO PAY FOR THE VERY BASICS LIKE FOOD, HOUSING, MEDICAL CARE, AND HEATING?: NOT HARD AT ALL

## 2022-09-21 NOTE — PROGRESS NOTES
Subjective:      Patient ID: Александр Fermin is a 56 y.o. male. Dr Ivon Dukes  on  9-2-22  with  circ   and  recurrent  balanatitis and  dr Ivon Dukes  out  of  town     Skin Problem  This is a new problem. The current episode started in the past 7 days. The problem has been gradually worsening since onset. Location: cicunmcision   done and  drainage. The rash is characterized by draining and redness. Pertinent negatives include no congestion, cough, eye pain, fatigue, fever, shortness of breath or sore throat. Past Medical History:   Diagnosis Date    Adenomatous colon polyp 05/27/2022    COVID-19 virus infection 12/2020    Obesity, Class II, BMI 35-39.9, no comorbidity     Tobacco dependence in remission       Review of Systems   Constitutional:  Negative for fatigue and fever. HENT:  Negative for congestion, ear pain, postnasal drip, sore throat and trouble swallowing. Eyes:  Negative for pain. Respiratory:  Negative for cough, chest tightness and shortness of breath. Cardiovascular:  Negative for chest pain, palpitations and leg swelling. Gastrointestinal:  Negative for abdominal pain, blood in stool, constipation and nausea. Genitourinary:  Negative for difficulty urinating, dysuria, frequency and urgency. Penile  area  of the  circ   with  drainage    Musculoskeletal:  Negative for arthralgias, back pain, joint swelling and neck stiffness. Skin:  Negative for rash. Neurological:  Negative for dizziness, weakness and headaches. Hematological:  Negative for adenopathy. Does not bruise/bleed easily. Psychiatric/Behavioral:  Negative for behavioral problems, dysphoric mood and sleep disturbance.     /76 (Site: Right Upper Arm, Position: Sitting, Cuff Size: Medium Adult)   Pulse 64   Resp 18   Ht 6' (1.829 m)   Wt 281 lb 8 oz (127.7 kg)   BMI 38.18 kg/m²    Objective:   Physical Exam  Genitourinary:     Comments:        6:00 position of the circumcision is well increased drainage present. Wound otherwise okay but this is a yellowish-type drainage present there is no redness along the shaft of the penis. There's no inguinal adenopathy. There's no testicle pain      Assessment:        ICD-10-CM    1. Postoperative wound infection  T81.49XA Culture, Aerobic     cephALEXin (KEFLEX) 500 MG capsule     sulfamethoxazole-trimethoprim (BACTRIM DS) 800-160 MG per tablet             Plan:      Current Outpatient Medications   Medication Sig Dispense Refill    cephALEXin (KEFLEX) 500 MG capsule Take 1 capsule by mouth 3 times daily 21 capsule 0    sulfamethoxazole-trimethoprim (BACTRIM DS) 800-160 MG per tablet Take 1 tablet by mouth 2 times daily for 7 days 14 tablet 0    nystatin-triamcinolone (MYCOLOG II) 272607-7.1 UNIT/GM-% cream Apply up to 4 times a day to penile foreskin as needed 1 each 3    Ascorbic Acid (VITAMIN C) 500 MG tablet Take 500 mg by mouth daily      Multiple Vitamins-Minerals (THERAPEUTIC MULTIVITAMIN-MINERALS) tablet Take 1 tablet by mouth daily. No current facility-administered medications for this visit.             Hibiclens soap    Orders Placed This Encounter   Procedures    Culture, Aerobic     Circumcision site      Folow up  with  urology   Marek Mast MD

## 2022-09-27 LAB
AEROBIC CULTURE: ABNORMAL
SITE/TYPE: ABNORMAL

## 2022-09-28 ENCOUNTER — PATIENT MESSAGE (OUTPATIENT)
Dept: FAMILY MEDICINE CLINIC | Age: 47
End: 2022-09-28

## 2022-09-28 ENCOUNTER — TELEPHONE (OUTPATIENT)
Dept: FAMILY MEDICINE CLINIC | Age: 47
End: 2022-09-28

## 2022-09-28 RX ORDER — SULFAMETHOXAZOLE AND TRIMETHOPRIM 800; 160 MG/1; MG/1
1 TABLET ORAL 2 TIMES DAILY
Qty: 20 TABLET | Refills: 0 | Status: SHIPPED | OUTPATIENT
Start: 2022-09-28 | End: 2022-10-08

## 2022-09-28 NOTE — TELEPHONE ENCOUNTER
From: Lydia Gallagher Nice  To: Dr. Marek Mast  Sent: 9/28/2022 10:44 AM EDT  Subject: Question regarding CULTURE, AEROBIC    I am not currently taking Augmentin. I currently have Cephalexin, which will run out today. And the sulfamethoxazole prescription that was sent in by you.

## 2022-09-30 NOTE — TELEPHONE ENCOUNTER
Apologize as yes keflex and the bactrim and continue on the  bactrim     Is wound better  and if cleared no more bactrim but still drainage 5 more days of bactrim and see urology 10-3-22    Please call

## 2022-10-03 ENCOUNTER — OFFICE VISIT (OUTPATIENT)
Dept: UROLOGY | Age: 47
End: 2022-10-03
Payer: COMMERCIAL

## 2022-10-03 VITALS — WEIGHT: 280 LBS | HEIGHT: 72 IN | BODY MASS INDEX: 37.93 KG/M2

## 2022-10-03 DIAGNOSIS — N48.1 BALANITIS: Primary | ICD-10-CM

## 2022-10-03 PROCEDURE — 99213 OFFICE O/P EST LOW 20 MIN: CPT | Performed by: UROLOGY

## 2022-10-03 NOTE — PROGRESS NOTES
Jose Vallejo MD.    [address] [place] [address]  Dept: [phone]  Dept Fax: [phone]  Loc: [address] [facility] Urology Office Note -     Patient:  Epi Lane  YOB: 1975    The patient is a 56 y.o. male who presents today for evaluation of the following problems:   Chief Complaint   Patient presents with    1 Month Follow-Up    Post-Op Check     Post op circ and wound check- resolved. referred/consultation requested by Miladys Pond MD.    History of Present Illness:    LUTS  Improved  Urethritis treated with doxy in past  Some freq/urgency of void-- worse at night  No foul smell  No dysuria  No hematuria  Not sexually active    Balanitis  S/p circ    Requested/reviewed records from Miladys Pond MD office and/or outside [de-identified]    (Patient's old records have been requested, reviewed and pertinent findings summarized in today's note.)    Procedures Today: N/A      Last several PSA's:  No results found for: PSA    Last total testosterone:  No results found for: TESTOSTERONE    Urinalysis today:  No results found for this visit on 10/03/22. Last BUN and creatinine:  Lab Results   Component Value Date    BUN 10 08/19/2022     Lab Results   Component Value Date    CREATININE 0.9 08/19/2022         Imaging Reviewed during this Office Visit:   Astrid Odom MD independently reviewed the images and verified the radiology reports from:    No results found.     PAST MEDICAL, FAMILY AND SOCIAL HISTORY:  Past Medical History:   Diagnosis Date    Adenomatous colon polyp 05/27/2022    COVID-19 virus infection 12/2020    Obesity, Class II, BMI 35-39.9, no comorbidity     Tobacco dependence in remission      Past Surgical History:   Procedure Laterality Date    ARM SURGERY  03/2016    [facility] doctor - possibly Dr Siobhan Goldstein  2007, 2008    L 4-5    CIRCUMCISION N/A 9/2/2022 Circumcision performed by Theodore Meek MD at [address] [place] [address]  2013     Family History   Problem Relation Age of Onset    Heart Disease Father      Outpatient Medications Marked as Taking for the 10/3/22 encounter (Office Visit) with Theodore Meek MD   Medication Sig Dispense Refill    cephALEXin (KEFLEX) 500 MG capsule Take 1 capsule by mouth 3 times daily 21 capsule 0    nystatin-triamcinolone (MYCOLOG II) 629784-2.1 UNIT/GM-% cream Apply up to 4 times a day to penile foreskin as needed 1 each 3    Ascorbic Acid (VITAMIN C) 500 MG tablet Take 500 mg by mouth daily      Multiple Vitamins-Minerals (THERAPEUTIC MULTIVITAMIN-MINERALS) tablet Take 1 tablet by mouth daily. Patient has no known allergies. Social History     Tobacco Use   Smoking Status Former    Types: Cigarettes    Quit date: 2011    Years since quittin.3   Smokeless Tobacco Never      (If patient a smoker, smoking cessation counseling offered)   Social History     Substance and Sexual Activity   Alcohol Use No       REVIEW OF SYSTEMS:  Constitutional: negative  Eyes: negative  Respiratory: negative  Cardiovascular: negative  Gastrointestinal: negative  Genitourinary: see HPI  Musculoskeletal: negative  Skin: negative   Neurological: negative  Hematological/Lymphatic: negative  Psychological: negative      Physical Exam:    This a 56 y.o. male  There were no vitals filed for this visit. Body mass index is 37.97 kg/m². Constitutional: Patient in no acute distress;       Assessment and Plan        1. Balanitis               Plan:      S/p circumcision healing well  Urethritis has resolved  F/u PRN      Prescriptions Ordered:  No orders of the defined types were placed in this encounter. Orders Placed:  No orders of the defined types were placed in this encounter.            Astrid Odom MD

## 2022-10-11 ENCOUNTER — HOSPITAL ENCOUNTER (OUTPATIENT)
Dept: GENERAL RADIOLOGY | Age: 47
Discharge: HOME | End: 2022-10-11
Payer: COMMERCIAL

## 2022-10-11 ENCOUNTER — OFFICE VISIT (OUTPATIENT)
Dept: FAMILY MEDICINE CLINIC | Age: 47
End: 2022-10-11

## 2022-10-11 ENCOUNTER — HOSPITAL ENCOUNTER (OUTPATIENT)
Age: 47
Discharge: HOME | End: 2022-10-11
Payer: COMMERCIAL

## 2022-10-11 ENCOUNTER — TELEPHONE (OUTPATIENT)
Dept: FAMILY MEDICINE CLINIC | Age: 47
End: 2022-10-11

## 2022-10-11 VITALS
DIASTOLIC BLOOD PRESSURE: 70 MMHG | SYSTOLIC BLOOD PRESSURE: 128 MMHG | WEIGHT: 287.38 LBS | HEART RATE: 68 BPM | BODY MASS INDEX: 38.92 KG/M2 | HEIGHT: 72 IN | RESPIRATION RATE: 16 BRPM

## 2022-10-11 DIAGNOSIS — M54.10 RADICULOPATHY OF ARM: Primary | ICD-10-CM

## 2022-10-11 DIAGNOSIS — M54.10 RADICULOPATHY OF ARM: ICD-10-CM

## 2022-10-11 PROCEDURE — 99213 OFFICE O/P EST LOW 20 MIN: CPT | Performed by: FAMILY MEDICINE

## 2022-10-11 PROCEDURE — 72050 X-RAY EXAM NECK SPINE 4/5VWS: CPT

## 2022-10-11 RX ORDER — TIZANIDINE 4 MG/1
4 TABLET ORAL NIGHTLY
Qty: 10 TABLET | Refills: 0 | Status: SHIPPED | OUTPATIENT
Start: 2022-10-11 | End: 2022-10-21

## 2022-10-11 RX ORDER — PREDNISONE 20 MG/1
20 TABLET ORAL DAILY
Qty: 10 TABLET | Refills: 0 | Status: SHIPPED | OUTPATIENT
Start: 2022-10-11 | End: 2022-10-21

## 2022-10-11 ASSESSMENT — ENCOUNTER SYMPTOMS
SINUS PRESSURE: 0
CONSTIPATION: 0
SHORTNESS OF BREATH: 0

## 2022-10-11 NOTE — PROGRESS NOTES
Marvina Alpers Nice (:  1975) is a 56 y.o. male,Established patient, here for evaluation of the following chief complaint(s):  Neck Pain (Back pain)         ASSESSMENT/PLAN:   Diagnosis Orders   1. Radiculopathy of arm  predniSONE (DELTASONE) 20 MG tablet    tiZANidine (ZANAFLEX) 4 MG tablet    XR CERVICAL SPINE (4-5 VIEWS)             Get the xray done  Contact the chiropractor for follow up  Take the two meds as directed    Subjective   SUBJECTIVE/OBJECTIVE:  HPI  He states about a month ago there was a tightness to the left paracervical area. It moved to the scapular area and now there is pain and pins and needles to the entire left arm  He was stretching the arms and then felt the neck pain. He tried some naproxyn and tylenol without help. No past episodes. Has not seen the chiropractor. The left arm has no strength loss  Turning the head makes no difference and straightening the arm seems to help  There was left bicept tendon surgery 3-4 years ago and had no trouble since  Review of Systems   Constitutional:  Negative for fatigue. HENT:  Negative for sinus pressure. Eyes:  Negative for visual disturbance. Respiratory:  Negative for shortness of breath. Cardiovascular:  Negative for chest pain. Gastrointestinal:  Negative for constipation. Genitourinary: Negative. Musculoskeletal:  Positive for arthralgias, myalgias, neck pain and neck stiffness. Skin:  Negative for rash. Neurological:  Negative for headaches. The patient's medications, allergies, past medical problems, surgical, social, and family histories were reviewed and updated as needed. Objective   Physical Exam  Constitutional:       Appearance: Normal appearance. He is well-developed. HENT:      Head: Normocephalic and atraumatic. Eyes:      General: No scleral icterus. Conjunctiva/sclera: Conjunctivae normal.   Neck:      Trachea: No tracheal deviation. Cardiovascular:      Rate and Rhythm: Normal rate. Pulmonary:      Effort: Pulmonary effort is normal.   Musculoskeletal:      Cervical back: Normal range of motion and neck supple. Comments: Good ROM at the left shoulder.  is 5/5, radial pulse 4/4 and could not get the left or right bicept tendon reflex   Lymphadenopathy:      Cervical: No cervical adenopathy. Skin:     General: Skin is warm and dry. Neurological:      General: No focal deficit present. Mental Status: He is alert. Psychiatric:         Behavior: Behavior normal.              An electronic signature was used to authenticate this note.     --Rom Saxena MD

## 2022-11-11 DIAGNOSIS — J01.90 ACUTE SINUSITIS, RECURRENCE NOT SPECIFIED, UNSPECIFIED LOCATION: ICD-10-CM

## 2022-11-11 DIAGNOSIS — J20.9 ACUTE BRONCHITIS, UNSPECIFIED ORGANISM: ICD-10-CM

## 2022-11-11 RX ORDER — GUAIFENESIN AND CODEINE PHOSPHATE 100; 10 MG/5ML; MG/5ML
10 SOLUTION ORAL 4 TIMES DAILY PRN
Qty: 150 ML | Refills: 0 | Status: SHIPPED | OUTPATIENT
Start: 2022-11-11 | End: 2022-11-17 | Stop reason: SDUPTHER

## 2022-11-11 RX ORDER — AZITHROMYCIN 250 MG/1
TABLET, FILM COATED ORAL
Qty: 1 PACKET | Refills: 0 | Status: SHIPPED | OUTPATIENT
Start: 2022-11-11 | End: 2022-11-21

## 2022-11-17 DIAGNOSIS — J20.9 ACUTE BRONCHITIS, UNSPECIFIED ORGANISM: ICD-10-CM

## 2022-11-17 RX ORDER — GUAIFENESIN AND CODEINE PHOSPHATE 100; 10 MG/5ML; MG/5ML
10 SOLUTION ORAL 4 TIMES DAILY PRN
Qty: 150 ML | Refills: 0 | Status: SHIPPED | OUTPATIENT
Start: 2022-11-17 | End: 2022-11-24

## 2022-11-17 RX ORDER — GUAIFENESIN AND CODEINE PHOSPHATE 100; 10 MG/5ML; MG/5ML
10 SOLUTION ORAL 4 TIMES DAILY PRN
Qty: 150 ML | Refills: 0 | Status: CANCELLED | OUTPATIENT
Start: 2022-11-17 | End: 2022-11-24

## 2022-11-17 NOTE — TELEPHONE ENCOUNTER
Date of last visit:  10/11/2022  Date of next visit:  4/12/2023    Requested Prescriptions     Pending Prescriptions Disp Refills    guaiFENesin-codeine (CHERATUSSIN AC) 100-10 MG/5ML syrup 150 mL 0     Sig: Take 10 mLs by mouth 4 times daily as needed for Cough for up to 7 days.

## 2023-07-25 ENCOUNTER — OFFICE VISIT (OUTPATIENT)
Dept: UROLOGY | Age: 48
End: 2023-07-25
Payer: COMMERCIAL

## 2023-07-25 VITALS — RESPIRATION RATE: 16 BRPM | WEIGHT: 286.2 LBS | HEIGHT: 72 IN | BODY MASS INDEX: 38.76 KG/M2

## 2023-07-25 DIAGNOSIS — N52.9 ERECTILE DYSFUNCTION, UNSPECIFIED ERECTILE DYSFUNCTION TYPE: ICD-10-CM

## 2023-07-25 DIAGNOSIS — N48.1 BALANITIS: Primary | ICD-10-CM

## 2023-07-25 DIAGNOSIS — R35.0 URINARY FREQUENCY: ICD-10-CM

## 2023-07-25 PROCEDURE — 99214 OFFICE O/P EST MOD 30 MIN: CPT | Performed by: UROLOGY

## 2023-07-25 RX ORDER — TADALAFIL 5 MG/1
5 TABLET ORAL DAILY
Qty: 30 TABLET | Refills: 11 | Status: SHIPPED | OUTPATIENT
Start: 2023-07-25

## 2023-07-25 NOTE — PROGRESS NOTES
Jaycee Brunson MD.    [address]  [facility] [address]  Dept: [phone]  Dept Fax: [phone]  Loc: [facility] Urology Office Note -     Patient:  Julio Dotson  YOB: 1975    The patient is a 53 y.o. male who presents today for evaluation of the following problems:   Chief Complaint   Patient presents with    Urinary Frequency    Follow-up     Wants to discuss ED    referred/consultation requested by Lobo Costello MD.    History of Present Illness:    LUTS  Urethritis treated with doxy in past  Some freq/urgency of void-- stable  No foul smell  No dysuria  No hematuria    Balanitis  S/p circ  Well healed    ED  Worsening  Staying erect is an issue. No issues with achieving  Questions/concerns    Requested/reviewed records from Lobo Costello MD office and/or outside physician/EMR    (Patient's old records have been requested, reviewed and pertinent findings summarized in today's note.)    Procedures Today: N/A      Last several PSA's:  No results found for: PSA    Last total testosterone:  No results found for: TESTOSTERONE    Urinalysis today:  No results found for this visit on 07/25/23. Last BUN and creatinine:  Lab Results   Component Value Date    BUN 10 08/19/2022     Lab Results   Component Value Date    CREATININE 0.9 08/19/2022         Imaging Reviewed during this Office Visit:   Jerome Cao MD independently reviewed the images and verified the radiology reports from:    No results found.     PAST MEDICAL, FAMILY AND SOCIAL HISTORY:  Past Medical History:   Diagnosis Date    Adenomatous colon polyp 05/27/2022    COVID-19 virus infection 12/2020    Obesity, Class II, BMI 35-39.9, no comorbidity     Tobacco dependence in remission      Past Surgical History:   Procedure Laterality Date    ARM SURGERY  03/2016    [facility] doctor - possibly Dr Rodriguez Sullivan  2007, 2008    L 4-5

## 2023-12-13 DIAGNOSIS — J20.9 ACUTE BRONCHITIS, UNSPECIFIED ORGANISM: ICD-10-CM

## 2023-12-13 RX ORDER — CODEINE PHOSPHATE/GUAIFENESIN 10-100MG/5
10 LIQUID (ML) ORAL 4 TIMES DAILY PRN
Qty: 120 ML | Refills: 0 | Status: SHIPPED | OUTPATIENT
Start: 2023-12-13 | End: 2023-12-20 | Stop reason: SDUPTHER

## 2023-12-13 RX ORDER — AZITHROMYCIN 250 MG/1
TABLET, FILM COATED ORAL
Qty: 1 PACKET | Refills: 0 | Status: SHIPPED | OUTPATIENT
Start: 2023-12-13 | End: 2023-12-23

## 2023-12-20 DIAGNOSIS — J20.9 ACUTE BRONCHITIS, UNSPECIFIED ORGANISM: ICD-10-CM

## 2023-12-20 RX ORDER — CODEINE PHOSPHATE/GUAIFENESIN 10-100MG/5
10 LIQUID (ML) ORAL 4 TIMES DAILY PRN
Qty: 120 ML | Refills: 0 | Status: SHIPPED | OUTPATIENT
Start: 2023-12-20 | End: 2023-12-24

## 2024-01-29 ENCOUNTER — TELEPHONE (OUTPATIENT)
Dept: FAMILY MEDICINE CLINIC | Age: 49
End: 2024-01-29

## 2024-01-29 RX ORDER — AZITHROMYCIN 250 MG/1
250 TABLET, FILM COATED ORAL SEE ADMIN INSTRUCTIONS
Qty: 6 TABLET | Refills: 0 | Status: SHIPPED | OUTPATIENT
Start: 2024-01-29 | End: 2024-02-03

## 2024-01-29 NOTE — TELEPHONE ENCOUNTER
Date of last visit:  4/12/2023  Date of next visit:  4/17/2024    Requested Prescriptions     Signed Prescriptions Disp Refills    azithromycin (ZITHROMAX) 250 MG tablet 6 tablet 0     Sig: Take 1 tablet by mouth See Admin Instructions for 5 days 500mg on day 1 followed by 250mg on days 2 - 5     Authorizing Provider: GARY GAMBLE     Ordering User: DONNA ARAMBULA

## 2024-01-29 NOTE — TELEPHONE ENCOUNTER
----- Message from Alton Guerrero sent at 1/29/2024  9:16 AM EST -----  Regarding: Sinuses  Contact: [phone]  Hello,   The z-pac I got worked but my sinus infection has returned. Can you call in another z-pac?   So far the cough has not returned. If you can’t call it in I’d like to schedule an appointment for either earliest time or latest times available.

## 2024-01-31 ENCOUNTER — TELEPHONE (OUTPATIENT)
Dept: FAMILY MEDICINE CLINIC | Age: 49
End: 2024-01-31

## 2024-01-31 NOTE — TELEPHONE ENCOUNTER
----- Message from Alton Guerrero sent at 1/31/2024 10:54 AM EST -----  Regarding: Sinuses  Contact: [phone]  Thank you for calling in the z-pack. It is definitely helping. However now the coughing has returned and it is once again causing me to lose sleep. Would you be able to call in another bottle of the cough syrup? If a larger bottle is available than the last time, that would be helpful. If I need to come in to be seen please let me know.     Thanks  Alton Guerrero

## 2024-02-01 ENCOUNTER — TELEMEDICINE (OUTPATIENT)
Dept: FAMILY MEDICINE CLINIC | Age: 49
End: 2024-02-01

## 2024-02-01 DIAGNOSIS — R05.2 SUBACUTE COUGH: Primary | ICD-10-CM

## 2024-02-01 DIAGNOSIS — R09.82 PND (POST-NASAL DRIP): ICD-10-CM

## 2024-02-01 RX ORDER — PREDNISONE 10 MG/1
TABLET ORAL
Qty: 16 TABLET | Refills: 0 | Status: SHIPPED | OUTPATIENT
Start: 2024-02-01

## 2024-02-01 RX ORDER — CETIRIZINE HYDROCHLORIDE 10 MG/1
10 TABLET ORAL DAILY
Qty: 30 TABLET | Refills: 0 | Status: SHIPPED | OUTPATIENT
Start: 2024-02-01 | End: 2024-03-02

## 2024-02-01 ASSESSMENT — PATIENT HEALTH QUESTIONNAIRE - PHQ9
6. FEELING BAD ABOUT YOURSELF - OR THAT YOU ARE A FAILURE OR HAVE LET YOURSELF OR YOUR FAMILY DOWN: 0
8. MOVING OR SPEAKING SO SLOWLY THAT OTHER PEOPLE COULD HAVE NOTICED. OR THE OPPOSITE, BEING SO FIGETY OR RESTLESS THAT YOU HAVE BEEN MOVING AROUND A LOT MORE THAN USUAL: 0
3. TROUBLE FALLING OR STAYING ASLEEP: 0
SUM OF ALL RESPONSES TO PHQ QUESTIONS 1-9: 0
SUM OF ALL RESPONSES TO PHQ9 QUESTIONS 1 & 2: 0
1. LITTLE INTEREST OR PLEASURE IN DOING THINGS: 0
10. IF YOU CHECKED OFF ANY PROBLEMS, HOW DIFFICULT HAVE THESE PROBLEMS MADE IT FOR YOU TO DO YOUR WORK, TAKE CARE OF THINGS AT HOME, OR GET ALONG WITH OTHER PEOPLE: 0
4. FEELING TIRED OR HAVING LITTLE ENERGY: 0
5. POOR APPETITE OR OVEREATING: 0
SUM OF ALL RESPONSES TO PHQ QUESTIONS 1-9: 0
2. FEELING DOWN, DEPRESSED OR HOPELESS: 0
7. TROUBLE CONCENTRATING ON THINGS, SUCH AS READING THE NEWSPAPER OR WATCHING TELEVISION: 0

## 2024-02-01 ASSESSMENT — ENCOUNTER SYMPTOMS
DIARRHEA: 0
VOMITING: 0
SINUS PRESSURE: 0
VOICE CHANGE: 0
SORE THROAT: 0
WHEEZING: 0
RHINORRHEA: 0
COUGH: 1
ABDOMINAL PAIN: 0
BACK PAIN: 0
CHEST TIGHTNESS: 0
SHORTNESS OF BREATH: 0
TROUBLE SWALLOWING: 0
CONSTIPATION: 0
NAUSEA: 0

## 2024-02-01 NOTE — PROGRESS NOTES
Telemedicine visit :  Date: 2/1/2024     Patient verified Video Chat was not encrypted, and agrees to the Video Exam in presence of nurse.       Alton Guerrero is a 49 y.o.male who presents today  Chief Complaint   Patient presents with    Cough         HPI:     Patient was seen via telemedicine, face time with his/her phone.    Cough x 1 week,dry cough.no tickles, + post nasal drip, no smoking no second hand smoking, no cannabis . Been on Z pack last December and January. + sinus congestion.    Tried Mucinex, Robitussin    Had same from time to time      Cough  Associated symptoms include postnasal drip. Pertinent negatives include no chest pain, chills, ear pain, fever, headaches, myalgias, rash, rhinorrhea, sore throat, shortness of breath or wheezing.       CurrentHome Medications were updated and reviewed by this provider  Current Outpatient Medications   Medication Sig Dispense Refill    predniSONE (DELTASONE) 10 MG tablet 2 tablets 2 times a day for 2 days then 1  Tablet 2 times a day for 2 days, then 1 tablet daily till gone 16 tablet 0    cetirizine (ZYRTEC) 10 MG tablet Take 1 tablet by mouth daily 30 tablet 0    azithromycin (ZITHROMAX) 250 MG tablet Take 1 tablet by mouth See Admin Instructions for 5 days 500mg on day 1 followed by 250mg on days 2 - 5 6 tablet 0    tadalafil (CIALIS) 5 MG tablet Take 1 tablet by mouth daily 30 tablet 11    Ascorbic Acid (VITAMIN C) 500 MG tablet Take 1 tablet by mouth daily      Multiple Vitamins-Minerals (THERAPEUTIC MULTIVITAMIN-MINERALS) tablet Take 1 tablet by mouth daily       No current facility-administered medications for this visit.       Subjective:      Review of Systems   Constitutional:  Negative for appetite change, chills, diaphoresis, fatigue and fever.   HENT:  Positive for congestion and postnasal drip. Negative for ear discharge, ear pain, rhinorrhea, sinus pressure, sore throat, trouble swallowing and voice change.    Respiratory:  Positive for cough.

## 2024-02-01 NOTE — PROGRESS NOTES
Alton agreed to Video Chat/Exam in presence of  and myself. Verified who was present in room with Alton. Alton informed the e-mail address used to Face Time cannot be used to contact the Provider, if they are any questions or concerns they need to call the office directly. Alton stated understanding.

## 2024-02-20 ENCOUNTER — OFFICE VISIT (OUTPATIENT)
Dept: FAMILY MEDICINE CLINIC | Age: 49
End: 2024-02-20

## 2024-02-20 VITALS
HEART RATE: 68 BPM | DIASTOLIC BLOOD PRESSURE: 80 MMHG | RESPIRATION RATE: 20 BRPM | SYSTOLIC BLOOD PRESSURE: 124 MMHG | WEIGHT: 284 LBS | HEIGHT: 72 IN | BODY MASS INDEX: 38.47 KG/M2

## 2024-02-20 DIAGNOSIS — J20.9 ACUTE BRONCHITIS, UNSPECIFIED ORGANISM: ICD-10-CM

## 2024-02-20 DIAGNOSIS — J01.90 ACUTE SINUSITIS, RECURRENCE NOT SPECIFIED, UNSPECIFIED LOCATION: Primary | ICD-10-CM

## 2024-02-20 PROCEDURE — 99213 OFFICE O/P EST LOW 20 MIN: CPT | Performed by: FAMILY MEDICINE

## 2024-02-20 RX ORDER — AZITHROMYCIN 250 MG/1
TABLET, FILM COATED ORAL
Qty: 1 PACKET | Refills: 0 | Status: SHIPPED | OUTPATIENT
Start: 2024-02-20 | End: 2024-03-01

## 2024-02-20 RX ORDER — PREDNISONE 20 MG/1
20 TABLET ORAL DAILY
Qty: 7 TABLET | Refills: 0 | Status: SHIPPED | OUTPATIENT
Start: 2024-02-20 | End: 2024-02-27

## 2024-02-20 RX ORDER — CODEINE PHOSPHATE/GUAIFENESIN 10-100MG/5
10 LIQUID (ML) ORAL 4 TIMES DAILY PRN
Qty: 120 ML | Refills: 0 | Status: SHIPPED | OUTPATIENT
Start: 2024-02-20 | End: 2024-02-23

## 2024-02-20 ASSESSMENT — ENCOUNTER SYMPTOMS
NAUSEA: 0
SORE THROAT: 0
SINUS PAIN: 1
DIARRHEA: 1
SINUS PRESSURE: 1
CONSTIPATION: 0
COUGH: 1
SHORTNESS OF BREATH: 0

## 2024-02-20 NOTE — PROGRESS NOTES
Alton Guerrero (:  1975) is a 49 y.o. male,Established patient, here for evaluation of the following chief complaint(s):  Congestion and Cough         ASSESSMENT/PLAN:   Diagnosis Orders   1. Acute sinusitis, recurrence not specified, unspecified location  predniSONE (DELTASONE) 20 MG tablet    azithromycin (ZITHROMAX) 250 MG tablet    guaiFENesin-codeine (TUSSI-ORGANIDIN NR) 100-10 MG/5ML syrup      2. Acute bronchitis, unspecified organism  predniSONE (DELTASONE) 20 MG tablet    azithromycin (ZITHROMAX) 250 MG tablet    guaiFENesin-codeine (TUSSI-ORGANIDIN NR) 100-10 MG/5ML syrup        Off today and tomorrow  See me as arranged         Subjective   SUBJECTIVE/OBJECTIVE:  HPI  He states for the past month he has been ill  Dayquil, vit c and d and nyquil  Virtual visit helped with the prednisone.  Review of Systems   Constitutional:  Positive for chills and fatigue. Negative for fever.   HENT:  Positive for congestion, postnasal drip, sinus pressure, sinus pain and sneezing. Negative for ear pain and sore throat.    Eyes:  Negative for visual disturbance.   Respiratory:  Positive for cough. Negative for shortness of breath.    Cardiovascular:  Negative for chest pain.   Gastrointestinal:  Positive for diarrhea. Negative for constipation and nausea.   Genitourinary: Negative.    Musculoskeletal:  Negative for arthralgias and myalgias.   Skin:  Negative for rash.   Neurological:  Negative for headaches.    The patient's medications, allergies, past medical problems, surgical, social, and family histories were reviewed and updated as needed.      Objective   Physical Exam  Constitutional:       Appearance: Normal appearance. He is well-developed.   HENT:      Head: Normocephalic and atraumatic.      Right Ear: There is impacted cerumen.      Left Ear: There is impacted cerumen.      Nose: Congestion and rhinorrhea (cloudy) present.   Eyes:      General: No scleral icterus.     Conjunctiva/sclera: Conjunctivae

## 2024-02-26 DIAGNOSIS — J20.9 ACUTE BRONCHITIS, UNSPECIFIED ORGANISM: ICD-10-CM

## 2024-02-26 DIAGNOSIS — J01.90 ACUTE SINUSITIS, RECURRENCE NOT SPECIFIED, UNSPECIFIED LOCATION: ICD-10-CM

## 2024-02-26 RX ORDER — CODEINE PHOSPHATE/GUAIFENESIN 10-100MG/5
10 LIQUID (ML) ORAL 4 TIMES DAILY PRN
Qty: 120 ML | Refills: 0 | Status: SHIPPED | OUTPATIENT
Start: 2024-02-26 | End: 2024-02-29

## 2024-02-26 RX ORDER — AZITHROMYCIN 250 MG/1
TABLET, FILM COATED ORAL
Qty: 1 PACKET | Refills: 0 | Status: SHIPPED | OUTPATIENT
Start: 2024-02-26 | End: 2024-03-07

## 2024-02-26 RX ORDER — PREDNISONE 20 MG/1
20 TABLET ORAL DAILY
Qty: 7 TABLET | Refills: 0 | Status: SHIPPED | OUTPATIENT
Start: 2024-02-26 | End: 2024-03-04

## 2024-04-02 DIAGNOSIS — M72.2 PLANTAR FASCIITIS: Primary | ICD-10-CM

## 2024-06-25 ASSESSMENT — PATIENT HEALTH QUESTIONNAIRE - PHQ9
1. LITTLE INTEREST OR PLEASURE IN DOING THINGS: NOT AT ALL
SUM OF ALL RESPONSES TO PHQ9 QUESTIONS 1 & 2: 0
2. FEELING DOWN, DEPRESSED OR HOPELESS: NOT AT ALL
SUM OF ALL RESPONSES TO PHQ9 QUESTIONS 1 & 2: 0
2. FEELING DOWN, DEPRESSED OR HOPELESS: NOT AT ALL
SUM OF ALL RESPONSES TO PHQ QUESTIONS 1-9: 0
1. LITTLE INTEREST OR PLEASURE IN DOING THINGS: NOT AT ALL
SUM OF ALL RESPONSES TO PHQ QUESTIONS 1-9: 0

## 2024-06-26 ENCOUNTER — OFFICE VISIT (OUTPATIENT)
Dept: FAMILY MEDICINE CLINIC | Age: 49
End: 2024-06-26

## 2024-06-26 VITALS
HEART RATE: 66 BPM | BODY MASS INDEX: 38.33 KG/M2 | SYSTOLIC BLOOD PRESSURE: 122 MMHG | HEIGHT: 72 IN | RESPIRATION RATE: 18 BRPM | WEIGHT: 283 LBS | DIASTOLIC BLOOD PRESSURE: 66 MMHG

## 2024-06-26 DIAGNOSIS — R79.89 ELEVATED PROLACTIN LEVEL: ICD-10-CM

## 2024-06-26 DIAGNOSIS — Z00.00 WELL ADULT EXAM: Primary | ICD-10-CM

## 2024-06-26 PROCEDURE — 99396 PREV VISIT EST AGE 40-64: CPT | Performed by: FAMILY MEDICINE

## 2024-06-26 SDOH — ECONOMIC STABILITY: FOOD INSECURITY: WITHIN THE PAST 12 MONTHS, THE FOOD YOU BOUGHT JUST DIDN'T LAST AND YOU DIDN'T HAVE MONEY TO GET MORE.: NEVER TRUE

## 2024-06-26 SDOH — ECONOMIC STABILITY: FOOD INSECURITY: WITHIN THE PAST 12 MONTHS, YOU WORRIED THAT YOUR FOOD WOULD RUN OUT BEFORE YOU GOT MONEY TO BUY MORE.: NEVER TRUE

## 2024-06-26 SDOH — ECONOMIC STABILITY: HOUSING INSECURITY
IN THE LAST 12 MONTHS, WAS THERE A TIME WHEN YOU DID NOT HAVE A STEADY PLACE TO SLEEP OR SLEPT IN A SHELTER (INCLUDING NOW)?: NO

## 2024-06-26 SDOH — ECONOMIC STABILITY: INCOME INSECURITY: HOW HARD IS IT FOR YOU TO PAY FOR THE VERY BASICS LIKE FOOD, HOUSING, MEDICAL CARE, AND HEATING?: NOT HARD AT ALL

## 2024-06-26 ASSESSMENT — ENCOUNTER SYMPTOMS
SINUS PRESSURE: 0
CONSTIPATION: 0
SHORTNESS OF BREATH: 0

## 2024-06-26 NOTE — PROGRESS NOTES
Alton Guerrero (:  1975) is a 49 y.o. male,Established patient, here for evaluation of the following chief complaint(s):  Annual Exam      Assessment & Plan    Diagnosis Orders   1. Well adult exam  Lipid, Fasting    Glucose      2. Elevated prolactin level  Prolactin           Do the fasting labs soon.  See me in a year    Subjective   HPI  Well Adult Physical   Patient here for a comprehensive physical exam.The patient reports no problems.  We discussed the weight being stable and the need to work on carbs and portions and 20 minutes of constant movement daily. We discussed the prolactin level and the normal MRI from several years ago. He denies and visual trouble and no HA.  Do you take any herbs or supplements that were not prescribed by a doctor? yes Are you taking calcium supplements? no Are you taking aspirin daily? no   History:  No FH of prostate cancer    Review of Systems   Constitutional:  Negative for fatigue.   HENT:  Negative for sinus pressure.    Eyes:  Negative for visual disturbance.   Respiratory:  Negative for shortness of breath.    Cardiovascular:  Negative for chest pain.   Gastrointestinal:  Negative for constipation.   Genitourinary: Negative.    Musculoskeletal:  Negative for arthralgias.   Skin:  Negative for rash.   Neurological:  Negative for headaches.   The patient's medications, allergies, past medical problems, surgical, social, and family histories were reviewed and updated as needed.         Objective   Physical Exam  Constitutional:       General: He is not in acute distress.     Appearance: He is well-developed.   HENT:      Head: Normocephalic and atraumatic.      Right Ear: External ear normal.      Left Ear: External ear normal.      Nose: Nose normal.      Mouth/Throat:      Pharynx: No oropharyngeal exudate.   Eyes:      General: No scleral icterus.     Conjunctiva/sclera: Conjunctivae normal.   Neck:      Thyroid: No thyromegaly.      Vascular: No carotid bruit.

## 2024-07-14 LAB
CHOLESTEROL, TOTAL: 172 MG/DL
CHOLESTEROL/HDL RATIO: 4.2 RATIO
GLUCOSE: 95 MG/DL (ref 70–99)
HDLC SERPL-MCNC: 41 MG/DL
LDL CHOLESTEROL: 109 MG/DL
LDL/HDL RATIO: 2.7 RATIO
PROLACTIN: 14.8 NG/ML (ref 4–26)
TRIGL SERPL-MCNC: 111 MG/DL
VLDLC SERPL CALC-MCNC: 22 MG/DL

## 2024-07-22 ENCOUNTER — TELEPHONE (OUTPATIENT)
Dept: FAMILY MEDICINE CLINIC | Age: 49
End: 2024-07-22

## 2024-07-22 NOTE — TELEPHONE ENCOUNTER
----- Message from Reese Mayfield MD sent at 7/21/2024 10:39 PM EDT -----  Let him know the labs were fine.

## 2024-08-08 ENCOUNTER — OFFICE VISIT (OUTPATIENT)
Dept: UROLOGY | Age: 49
End: 2024-08-08
Payer: COMMERCIAL

## 2024-08-08 VITALS — RESPIRATION RATE: 16 BRPM | HEIGHT: 72 IN | BODY MASS INDEX: 38.19 KG/M2 | WEIGHT: 282 LBS

## 2024-08-08 DIAGNOSIS — R35.0 URINARY FREQUENCY: ICD-10-CM

## 2024-08-08 DIAGNOSIS — N52.9 ERECTILE DYSFUNCTION, UNSPECIFIED ERECTILE DYSFUNCTION TYPE: Primary | ICD-10-CM

## 2024-08-08 DIAGNOSIS — Z87.438 HISTORY OF BALANITIS: ICD-10-CM

## 2024-08-08 PROCEDURE — 99213 OFFICE O/P EST LOW 20 MIN: CPT

## 2024-08-08 RX ORDER — TADALAFIL 5 MG/1
5 TABLET ORAL DAILY
Qty: 30 TABLET | Refills: 11 | Status: SHIPPED | OUTPATIENT
Start: 2024-08-08 | End: 2024-08-09 | Stop reason: SDUPTHER

## 2024-08-08 NOTE — PROGRESS NOTES
[facility]  [facility]  [address]  [address]  [facility] [address]  Dept: [phone]  Loc: [phone]  Visit Date: 8/8/2024      HPI  Alton Guerrero is a 49 y.o. male that presents to the urology clinic for erectile dysfunction follow-up.    LUTS  Mild frequency and urgency. IPSS of 4/1.  History of urethritis/irritative voiding symptoms treated with Doxycycline in the past, resolved.    ED  Difficulty maintaining erections. Started on Cialis 5 mg daily and reports that he is now able to achieve and maintain erections without difficulty.    Balanitis  S/P Circumcision. Resolved. Well healed. Patient pleased cosmetically.      Last BUN and Creatinine:  Lab Results   Component Value Date    BUN 10 08/19/2022     Lab Results   Component Value Date    CREATININE 0.9 08/19/2022           PAST MEDICAL, FAMILY AND SOCIAL HISTORY UPDATE:  Past Medical History:   Diagnosis Date    Adenomatous colon polyp 05/27/2022    COVID-19 virus infection 12/2020    Obesity, Class II, BMI 35-39.9, no comorbidity     Tobacco dependence in remission      Past Surgical History:   Procedure Laterality Date    ARM SURGERY  03/2016    [facility] doctor - possibly Dr Enriquez    BACK SURGERY  2007, 2008    L 4-5    CIRCUMCISION N/A 9/2/2022    Circumcision performed by Ralph Langston MD at [facility] OR    VASECTOMY  2013     Family History   Problem Relation Age of Onset    Heart Disease Father      Outpatient Medications Marked as Taking for the 8/8/24 encounter (Office Visit) with Eris Pederson PA-C   Medication Sig Dispense Refill    tadalafil (CIALIS) 5 MG tablet Take 1 tablet by mouth daily 30 tablet 11    Ascorbic Acid (VITAMIN C) 500 MG tablet Take 1 tablet by mouth daily      Multiple Vitamins-Minerals (THERAPEUTIC MULTIVITAMIN-MINERALS) tablet Take 1 tablet by mouth daily         Patient has no known allergies.  Social History     Tobacco Use   Smoking Status Former    Current packs/day: 0.00

## 2024-08-09 ENCOUNTER — PATIENT MESSAGE (OUTPATIENT)
Dept: UROLOGY | Age: 49
End: 2024-08-09

## 2024-08-09 RX ORDER — TADALAFIL 5 MG/1
5 TABLET ORAL DAILY
Qty: 30 TABLET | Refills: 11 | Status: SHIPPED | OUTPATIENT
Start: 2024-08-09

## 2024-08-09 NOTE — TELEPHONE ENCOUNTER
From: Alton Guerrero  To: Eris Pederson  Sent: 8/9/2024 12:24 PM EDT  Subject: Prescription    Can you call in my tadalafil refill to [facility] on [address] please. [facility] is too expensive.

## 2025-08-06 ENCOUNTER — OFFICE VISIT (OUTPATIENT)
Dept: UROLOGY | Age: 50
End: 2025-08-06
Payer: COMMERCIAL

## 2025-08-06 VITALS
OXYGEN SATURATION: 97 % | HEIGHT: 72 IN | HEART RATE: 69 BPM | WEIGHT: 286 LBS | RESPIRATION RATE: 21 BRPM | TEMPERATURE: 98.7 F | BODY MASS INDEX: 38.74 KG/M2

## 2025-08-06 DIAGNOSIS — Z87.438 HISTORY OF BALANITIS: ICD-10-CM

## 2025-08-06 DIAGNOSIS — N52.9 ERECTILE DYSFUNCTION, UNSPECIFIED ERECTILE DYSFUNCTION TYPE: Primary | ICD-10-CM

## 2025-08-06 DIAGNOSIS — R35.0 URINARY FREQUENCY: ICD-10-CM

## 2025-08-06 PROCEDURE — 99213 OFFICE O/P EST LOW 20 MIN: CPT

## (undated) DEVICE — SOLUTION IV IRRIG POUR BRL 0.9% SODIUM CHL 2F7124

## (undated) DEVICE — GOWN,SIRUS,NONRNF,SETINSLV,XL,20/CS: Brand: MEDLINE

## (undated) DEVICE — PENCIL SMK EVAC ALL IN 1 DSGN ENH VISIBILITY IMPROVED AIR

## (undated) DEVICE — INTENDED FOR TISSUE SEPARATION, AND OTHER PROCEDURES THAT REQUIRE A SHARP SURGICAL BLADE TO PUNCTURE OR CUT.: Brand: BARD-PARKER ® CARBON RIB-BACK BLADES

## (undated) DEVICE — SUTURE CHROMIC GUT SZ 3-0 L27IN ABSRB BRN L19MM FS-2 3/8 636H

## (undated) DEVICE — BREAST HERNIA PACK: Brand: MEDLINE INDUSTRIES, INC.

## (undated) DEVICE — ELECTRODE ELECSURG NDL 2.8 INX7.2 CM COAT INSUL EDGE

## (undated) DEVICE — GLOVE ORANGE PI 7 1/2   MSG9075

## (undated) DEVICE — ELECTRODE PT RET AD L9FT HI MOIST COND ADH HYDRGEL CORDED